# Patient Record
Sex: FEMALE | Race: WHITE | NOT HISPANIC OR LATINO | Employment: FULL TIME | URBAN - METROPOLITAN AREA
[De-identification: names, ages, dates, MRNs, and addresses within clinical notes are randomized per-mention and may not be internally consistent; named-entity substitution may affect disease eponyms.]

---

## 2023-07-10 ENCOUNTER — TELEPHONE (OUTPATIENT)
Dept: PRIMARY CARE | Facility: CLINIC | Age: 43
End: 2023-07-10
Payer: COMMERCIAL

## 2023-07-10 DIAGNOSIS — N64.59 ABNORMAL BREAST FINDING: Primary | ICD-10-CM

## 2023-07-10 NOTE — TELEPHONE ENCOUNTER
----- Message from Taylor Penny MD sent at 7/10/2023 11:12 AM EDT -----  Mammo showed some  asymmetry in the left breast.  They wan t to do  additional pictures.  Order placed.

## 2023-10-05 PROBLEM — K58.9 IRRITABLE BOWEL SYNDROME: Status: ACTIVE | Noted: 2023-10-05

## 2023-10-05 PROBLEM — M25.511 CHRONIC RIGHT SHOULDER PAIN: Status: ACTIVE | Noted: 2023-10-05

## 2023-10-05 PROBLEM — M62.89 HIGH-TONE PELVIC FLOOR DYSFUNCTION: Status: ACTIVE | Noted: 2023-10-05

## 2023-10-05 PROBLEM — H35.412 LATTICE DEGENERATION OF PERIPHERAL RETINA, LEFT: Status: ACTIVE | Noted: 2023-10-05

## 2023-10-05 PROBLEM — H52.13 BILATERAL MYOPIA: Status: ACTIVE | Noted: 2023-10-05

## 2023-10-05 PROBLEM — H52.203 ASTIGMATISM, BILATERAL: Status: ACTIVE | Noted: 2023-10-05

## 2023-10-05 PROBLEM — H52.12: Status: ACTIVE | Noted: 2023-10-05

## 2023-10-05 PROBLEM — H53.001 AMBLYOPIA OF RIGHT EYE: Status: ACTIVE | Noted: 2023-10-05

## 2023-10-05 PROBLEM — H52.7 REFRACTION ERROR: Status: ACTIVE | Noted: 2023-10-05

## 2023-10-05 PROBLEM — M79.18 MYOFASCIAL MUSCLE PAIN: Status: ACTIVE | Noted: 2023-10-05

## 2023-10-05 PROBLEM — D35.1 PARATHYROID ADENOMA: Status: ACTIVE | Noted: 2023-10-05

## 2023-10-05 PROBLEM — H52.31 ANISOMETROPIA: Status: ACTIVE | Noted: 2023-10-05

## 2023-10-05 PROBLEM — G43.109 MIGRAINE WITH AURA AND WITHOUT STATUS MIGRAINOSUS, NOT INTRACTABLE: Status: ACTIVE | Noted: 2023-10-05

## 2023-10-05 PROBLEM — E55.9 VITAMIN D DEFICIENCY: Status: ACTIVE | Noted: 2023-10-05

## 2023-10-05 PROBLEM — R10.2 FEMALE PELVIC PAIN: Status: ACTIVE | Noted: 2023-10-05

## 2023-10-05 PROBLEM — G43.009 MIGRAINE WITHOUT AURA AND WITHOUT STATUS MIGRAINOSUS, NOT INTRACTABLE: Status: ACTIVE | Noted: 2023-10-05

## 2023-10-05 PROBLEM — R09.A2 GLOBUS PHARYNGEUS: Status: ACTIVE | Noted: 2023-10-05

## 2023-10-05 PROBLEM — G56.21 ULNAR NEUROPATHY OF RIGHT UPPER EXTREMITY: Status: ACTIVE | Noted: 2023-10-05

## 2023-10-05 PROBLEM — H40.003 GLAUCOMA SUSPECT OF BOTH EYES: Status: ACTIVE | Noted: 2023-10-05

## 2023-10-05 PROBLEM — M19.011 ARTHRITIS OF RIGHT ACROMIOCLAVICULAR JOINT: Status: ACTIVE | Noted: 2023-10-05

## 2023-10-05 PROBLEM — E06.3 HASHIMOTO'S DISEASE: Status: ACTIVE | Noted: 2023-10-05

## 2023-10-05 PROBLEM — M79.7 FIBROMYALGIA: Status: ACTIVE | Noted: 2023-10-05

## 2023-10-05 PROBLEM — E04.1 SOLITARY THYROID NODULE: Status: ACTIVE | Noted: 2023-10-05

## 2023-10-05 PROBLEM — G47.00 INSOMNIA: Status: ACTIVE | Noted: 2023-10-05

## 2023-10-05 PROBLEM — K64.9 HEMORRHOID: Status: ACTIVE | Noted: 2023-10-05

## 2023-10-05 PROBLEM — M99.09 SEGMENTAL AND SOMATIC DYSFUNCTION: Status: ACTIVE | Noted: 2023-10-05

## 2023-10-05 PROBLEM — F41.8 DEPRESSION WITH ANXIETY: Status: ACTIVE | Noted: 2023-10-05

## 2023-10-05 PROBLEM — N94.6 DYSMENORRHEA: Status: ACTIVE | Noted: 2023-10-05

## 2023-10-05 PROBLEM — N94.10 UNSPECIFIED DYSPAREUNIA: Status: ACTIVE | Noted: 2023-10-05

## 2023-10-05 PROBLEM — G25.2 RESTING TREMOR: Status: ACTIVE | Noted: 2023-10-05

## 2023-10-05 PROBLEM — F41.9 ANXIETY: Status: ACTIVE | Noted: 2023-10-05

## 2023-10-05 PROBLEM — H52.4 BILATERAL PRESBYOPIA: Status: ACTIVE | Noted: 2023-10-05

## 2023-10-05 PROBLEM — E03.9 HYPOTHYROIDISM: Status: ACTIVE | Noted: 2023-10-05

## 2023-10-05 PROBLEM — M54.12 CERVICAL RADICULOPATHY: Status: ACTIVE | Noted: 2023-10-05

## 2023-10-05 PROBLEM — F32.2 CURRENT SEVERE EPISODE OF MAJOR DEPRESSIVE DISORDER WITHOUT PSYCHOTIC FEATURES (MULTI): Status: ACTIVE | Noted: 2023-10-05

## 2023-10-05 PROBLEM — G89.29 CHRONIC RIGHT SHOULDER PAIN: Status: ACTIVE | Noted: 2023-10-05

## 2023-10-05 PROBLEM — H35.411 LATTICE DEGENERATION OF RETINA, RIGHT: Status: ACTIVE | Noted: 2023-10-05

## 2023-10-05 RX ORDER — TIZANIDINE HYDROCHLORIDE 4 MG/1
4 CAPSULE, GELATIN COATED ORAL NIGHTLY PRN
COMMUNITY
Start: 2023-08-22 | End: 2023-10-06 | Stop reason: ALTCHOICE

## 2023-10-05 RX ORDER — DIPHENHYDRAMINE HCL 50 MG
1 CAPSULE ORAL NIGHTLY PRN
COMMUNITY
Start: 2020-06-17 | End: 2023-10-06 | Stop reason: ALTCHOICE

## 2023-10-05 RX ORDER — LEVOTHYROXINE SODIUM 100 UG/1
100 TABLET ORAL DAILY
COMMUNITY
End: 2024-02-11

## 2023-10-05 RX ORDER — CELECOXIB 200 MG/1
200 CAPSULE ORAL DAILY
COMMUNITY
End: 2023-10-06

## 2023-10-05 RX ORDER — TIMOLOL MALEATE 5 MG/ML
SOLUTION/ DROPS OPHTHALMIC
COMMUNITY
Start: 2023-05-16 | End: 2023-10-06 | Stop reason: ALTCHOICE

## 2023-10-05 RX ORDER — LATANOPROST/PF 0.005 %
1 DROPS OPHTHALMIC (EYE) EVERY MORNING
COMMUNITY
Start: 2023-07-10 | End: 2023-12-16 | Stop reason: WASHOUT

## 2023-10-05 RX ORDER — IBUPROFEN 600 MG/1
600 TABLET ORAL
COMMUNITY
Start: 2018-05-09 | End: 2023-10-06 | Stop reason: ALTCHOICE

## 2023-10-05 RX ORDER — TOBRAMYCIN AND DEXAMETHASONE 3; 1 MG/ML; MG/ML
SUSPENSION/ DROPS OPHTHALMIC
COMMUNITY
Start: 2023-05-23 | End: 2023-10-06 | Stop reason: ALTCHOICE

## 2023-10-05 RX ORDER — LITHIUM CARBONATE 150 MG/1
1 CAPSULE ORAL 2 TIMES DAILY
COMMUNITY
Start: 2023-04-26 | End: 2023-10-06 | Stop reason: SINTOL

## 2023-10-05 RX ORDER — TIZANIDINE 2 MG/1
TABLET ORAL
COMMUNITY
End: 2023-10-06 | Stop reason: ALTCHOICE

## 2023-10-05 RX ORDER — HYDROXYZINE HYDROCHLORIDE 25 MG/1
TABLET, FILM COATED ORAL
COMMUNITY
Start: 2023-08-18 | End: 2023-10-06 | Stop reason: SDUPTHER

## 2023-10-05 RX ORDER — MELATONIN 10 MG
10 CAPSULE ORAL
COMMUNITY

## 2023-10-06 ENCOUNTER — TELEMEDICINE (OUTPATIENT)
Dept: BEHAVIORAL HEALTH | Facility: CLINIC | Age: 43
End: 2023-10-06
Payer: COMMERCIAL

## 2023-10-06 DIAGNOSIS — F41.9 ANXIETY: ICD-10-CM

## 2023-10-06 DIAGNOSIS — F33.2 SEVERE EPISODE OF RECURRENT MAJOR DEPRESSIVE DISORDER, WITHOUT PSYCHOTIC FEATURES (MULTI): ICD-10-CM

## 2023-10-06 PROCEDURE — 99214 OFFICE O/P EST MOD 30 MIN: CPT | Performed by: PSYCHIATRY & NEUROLOGY

## 2023-10-06 RX ORDER — HYDROXYZINE HYDROCHLORIDE 25 MG/1
25 TABLET, FILM COATED ORAL 3 TIMES DAILY PRN
Qty: 90 TABLET | Refills: 0
Start: 2023-10-06 | End: 2024-04-23

## 2023-10-06 ASSESSMENT — ENCOUNTER SYMPTOMS
DYSPHORIC MOOD: 0
NERVOUS/ANXIOUS: 0

## 2023-10-06 NOTE — PROGRESS NOTES
"Adult Ambulatory Psychiatry Progress Note      Assessment/Plan     Impression:  Barbara Koroma is a 42 y.o. 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    A 41yo F domiciled w/ , employed at  in medical education w/ MD[employer] and [employer] presents for follow up.     Depression/anxiety - on ECT session 7 of 12, c/w hydroxyzine 25mg TID prn panic, f/u 4 weeks  Plan:   Medication:  Diagnoses and all orders for this visit:  Severe episode of recurrent major depressive disorder, without psychotic features (CMS/HCC) - completed ECT  Anxiety  -     hydrOXYzine HCL (Atarax) 25 mg tablet; Take 1 tablet (25 mg) by mouth 3 times a day as needed for allergies.      Therapy: none      Subjective   HPI:  Pt presented on time. She completed all her ECT sessions. She no longer has SI. Mood \"overall fine, better than it's been\". She's having trouble falling asleep. She has nightmares that someone is trying to hurt her and she can't get away. She fights to wake up, sits up for 15 minutes and then goes back to sleep. She feels tired from her sleep issues. She has some anxiety about going back to work. Discussed anesthesia as possible contributor to the nightmares.           Review of Systems   Psychiatric/Behavioral:  Negative for dysphoric mood and suicidal ideas. The patient is not nervous/anxious.          Objective   Mental Status Exam:  General Appearance: Well groomed, appropriate eye contact  Attitude/Behavior: Cooperative  Motor: No psychomotor agitation or retardation, no tremor or other abnormal movements  Speech: Normal rate, volume, prosody  Mood: \"ok\"  Affect: Euthymic, full-range  Thought Process: Linear, goal directed  Thought Associations: No loosening of associations  Thought Content: Normal  Perception: No perceptual abnormalities noted  Insight: Intact  Judgement: Intact    Vitals:  There were no vitals filed for this " visit.    Current Medications:  Current Outpatient Medications on File Prior to Visit   Medication Sig Dispense Refill    hydrOXYzine HCL (Atarax) 25 mg tablet TAKE 0.5-1 TABLET 3 TIMES DAILY AS NEEDED for anxiety      latanoprost, PF, 0.005 % drops Administer 1 drop into affected eye(s) once daily in the morning.      levothyroxine (Synthroid, Levoxyl) 100 mcg tablet Take 1 tablet (100 mcg) by mouth once daily.      melatonin 10 mg capsule Take 1 capsule (10 mg) by mouth. As needed for sleep      [DISCONTINUED] celecoxib (CeleBREX) 200 mg capsule Take 1 capsule (200 mg) by mouth once daily.      [DISCONTINUED] diphenhydrAMINE (Unisom SleepGels) 50 mg capsule Take 1 capsule (50 mg) by mouth as needed at bedtime.      [DISCONTINUED] ibuprofen 600 mg tablet Take 1 tablet (600 mg) by mouth. Every 6-8 hours as needed with food      [DISCONTINUED] lithium 150 mg capsule Take 1 capsule (150 mg) by mouth 2 times a day.      [DISCONTINUED] timolol (Timoptic) 0.5 % ophthalmic solution Instill one gtt o.u. qam      [DISCONTINUED] tiZANidine (Zanaflex) 2 mg tablet TAKE 1 TO 2 TABLETS BY MOUTH AT BEDTIME AS NEEDED      [DISCONTINUED] tiZANidine (Zanaflex) 4 mg capsule Take 1 capsule (4 mg) by mouth as needed at bedtime.      [DISCONTINUED] tobramycin-dexamethasone (Tobradex) ophthalmic suspension Administer into affected eye(s).       No current facility-administered medications on file prior to visit.       Lab Review:   No visits with results within 2 Month(s) from this visit.   Latest known visit with results is:   Legacy Encounter on 02/02/2023   Component Date Value    Glucose 02/02/2023 77     Sodium 02/02/2023 137     Potassium 02/02/2023 4.5     Chloride 02/02/2023 101     Bicarbonate 02/02/2023 26     Anion Gap 02/02/2023 15     Urea Nitrogen 02/02/2023 9     Creatinine 02/02/2023 0.91     GFR Female 02/02/2023 80     Calcium 02/02/2023 9.6     Albumin 02/02/2023 4.4     Alkaline Phosphatase 02/02/2023 73     Total  Protein 02/02/2023 7.9     AST 02/02/2023 27     Total Bilirubin 02/02/2023 0.5     ALT (SGPT) 02/02/2023 30     TSH 02/02/2023 2.78     WBC 02/02/2023 10.4     nRBC 02/02/2023 0.0     RBC 02/02/2023 4.52     Hemoglobin 02/02/2023 14.3     Hematocrit 02/02/2023 42.6     MCV 02/02/2023 94     MCHC 02/02/2023 33.6     Platelets 02/02/2023 283     RDW 02/02/2023 12.5     Neutrophils % 02/02/2023 69.3     Immature Granulocytes %,* 02/02/2023 0.4     Lymphocytes % 02/02/2023 20.9     Monocytes % 02/02/2023 6.9     Eosinophils % 02/02/2023 2.1     Basophils % 02/02/2023 0.4     Neutrophils Absolute 02/02/2023 7.23     Lymphocytes Absolute 02/02/2023 2.18     Monocytes Absolute 02/02/2023 0.72     Eosinophils Absolute 02/02/2023 0.22     Basophils Absolute 02/02/2023 0.04          Time Spent:         Next Appointment:  No follow-ups on file.

## 2023-11-17 ENCOUNTER — TELEMEDICINE (OUTPATIENT)
Dept: BEHAVIORAL HEALTH | Facility: CLINIC | Age: 43
End: 2023-11-17
Payer: COMMERCIAL

## 2023-11-17 DIAGNOSIS — F33.2 SEVERE EPISODE OF RECURRENT MAJOR DEPRESSIVE DISORDER, WITHOUT PSYCHOTIC FEATURES (MULTI): Primary | ICD-10-CM

## 2023-11-17 DIAGNOSIS — F41.9 ANXIETY: ICD-10-CM

## 2023-11-17 PROCEDURE — 99214 OFFICE O/P EST MOD 30 MIN: CPT | Performed by: PSYCHIATRY & NEUROLOGY

## 2023-11-17 ASSESSMENT — ENCOUNTER SYMPTOMS
DYSPHORIC MOOD: 1
NERVOUS/ANXIOUS: 1

## 2023-11-17 NOTE — PROGRESS NOTES
"Adult Ambulatory Psychiatry Progress Note      Assessment/Plan     Impression:  Barbara Koroma is a 42 y.o. 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    Plan:   Depression/anxiety - discussed wellbutrin or auvelity, c/w hydroxyzine 25mg TID prn panic, f/u 6 weeks       Subjective   HPI:  Pt arrived on time. Mood \"not well\". Her depression is returning. She's having trouble with insurance and has a bill of $44163. She's reluctant to go back given the short benefits. Discussed auvelity as an option. Pt is seeing therapist. She thinks part of the depression is due to external factors. The passive SI is back. Denies plan or intent. Suggested starting vitamin D. She's exercising more.           Review of Systems   Psychiatric/Behavioral:  Positive for dysphoric mood and suicidal ideas. The patient is nervous/anxious.        Objective   Mental Status Exam:  General Appearance: Well groomed, appropriate eye contact  Attitude/Behavior: Cooperative  Motor: No psychomotor agitation or retardation, no tremor or other abnormal movements  Speech: Normal rate, volume, prosody  Mood: depressed  Affect: Dysphoric, constricted but reactive  Thought Process: Linear, goal directed  Thought Associations: No loosening of associations  Thought Content:  (occasional passive SI)  Perception: No perceptual abnormalities noted  Insight: Intact  Judgement: Intact    Vitals:  There were no vitals filed for this visit.    Current Medications:  Current Outpatient Medications on File Prior to Visit   Medication Sig Dispense Refill    hydrOXYzine HCL (Atarax) 25 mg tablet Take 1 tablet (25 mg) by mouth 3 times a day as needed for allergies. 90 tablet 0    latanoprost, PF, 0.005 % drops Administer 1 drop into affected eye(s) once daily in the morning.      levothyroxine (Synthroid, Levoxyl) 100 mcg tablet Take 1 tablet (100 mcg) by mouth once daily.      melatonin 10 mg " capsule Take 1 capsule (10 mg) by mouth. As needed for sleep       No current facility-administered medications on file prior to visit.       Time Spent:    Prep time: 2  Direct patient time: 25  Documentation time: 5  Total time: 32 min

## 2023-11-26 DIAGNOSIS — H52.13 MYOPIA, BILATERAL: ICD-10-CM

## 2023-11-26 RX ORDER — CELECOXIB 200 MG/1
200 CAPSULE ORAL DAILY
Qty: 90 CAPSULE | Refills: 0 | Status: SHIPPED | OUTPATIENT
Start: 2023-11-26 | End: 2023-12-16 | Stop reason: WASHOUT

## 2023-12-16 ENCOUNTER — OFFICE VISIT (OUTPATIENT)
Dept: OPHTHALMOLOGY | Facility: CLINIC | Age: 43
End: 2023-12-16
Payer: COMMERCIAL

## 2023-12-16 DIAGNOSIS — H40.1131 PRIMARY OPEN ANGLE GLAUCOMA (POAG) OF BOTH EYES, MILD STAGE: Primary | ICD-10-CM

## 2023-12-16 PROCEDURE — 99212 OFFICE O/P EST SF 10 MIN: CPT | Performed by: OPHTHALMOLOGY

## 2023-12-16 ASSESSMENT — TONOMETRY
IOP_METHOD: GOLDMANN APPLANATION
OS_IOP_MMHG: 22
OD_IOP_MMHG: 20

## 2023-12-16 ASSESSMENT — ENCOUNTER SYMPTOMS
GASTROINTESTINAL NEGATIVE: 0
ALLERGIC/IMMUNOLOGIC NEGATIVE: 0
EYES NEGATIVE: 0
HEMATOLOGIC/LYMPHATIC NEGATIVE: 0
MUSCULOSKELETAL NEGATIVE: 0
RESPIRATORY NEGATIVE: 0
ENDOCRINE NEGATIVE: 0
NEUROLOGICAL NEGATIVE: 0
CARDIOVASCULAR NEGATIVE: 0
CONSTITUTIONAL NEGATIVE: 0
PSYCHIATRIC NEGATIVE: 0

## 2023-12-16 ASSESSMENT — CONF VISUAL FIELD
OD_INFERIOR_NASAL_RESTRICTION: 0
OD_NORMAL: 1
OD_SUPERIOR_NASAL_RESTRICTION: 0
OS_INFERIOR_NASAL_RESTRICTION: 0
OS_SUPERIOR_NASAL_RESTRICTION: 0
OS_SUPERIOR_TEMPORAL_RESTRICTION: 0
OD_SUPERIOR_TEMPORAL_RESTRICTION: 0
OD_INFERIOR_TEMPORAL_RESTRICTION: 0
OS_NORMAL: 1
OS_INFERIOR_TEMPORAL_RESTRICTION: 0

## 2023-12-16 ASSESSMENT — SLIT LAMP EXAM - LIDS
COMMENTS: GOOD POSITION
COMMENTS: GOOD POSITION

## 2023-12-16 ASSESSMENT — CUP TO DISC RATIO
OD_RATIO: .3
OS_RATIO: .3

## 2023-12-16 ASSESSMENT — VISUAL ACUITY
CORRECTION_TYPE: GLASSES
OS_CC: 20/20
METHOD: SNELLEN - LINEAR
OD_CC: 20/50

## 2023-12-16 ASSESSMENT — EXTERNAL EXAM - RIGHT EYE: OD_EXAM: NORMAL

## 2023-12-16 ASSESSMENT — PACHYMETRY
OD_CT(UM): 564
OS_CT(UM): 562

## 2023-12-16 ASSESSMENT — EXTERNAL EXAM - LEFT EYE: OS_EXAM: NORMAL

## 2023-12-18 ENCOUNTER — HOSPITAL ENCOUNTER (OUTPATIENT)
Dept: RADIOLOGY | Facility: HOSPITAL | Age: 43
Discharge: HOME | End: 2023-12-18
Payer: COMMERCIAL

## 2023-12-18 VITALS — WEIGHT: 185 LBS | HEIGHT: 62 IN | BODY MASS INDEX: 34.04 KG/M2

## 2023-12-18 DIAGNOSIS — N64.59 ABNORMAL BREAST FINDING: ICD-10-CM

## 2023-12-18 DIAGNOSIS — N64.59 OTHER SIGNS AND SYMPTOMS IN BREAST: ICD-10-CM

## 2023-12-18 PROCEDURE — 77061 BREAST TOMOSYNTHESIS UNI: CPT | Mod: LT

## 2023-12-18 PROCEDURE — 76642 ULTRASOUND BREAST LIMITED: CPT | Mod: LT

## 2024-01-19 ENCOUNTER — TELEMEDICINE (OUTPATIENT)
Dept: BEHAVIORAL HEALTH | Facility: CLINIC | Age: 44
End: 2024-01-19
Payer: COMMERCIAL

## 2024-01-19 DIAGNOSIS — F41.9 ANXIETY: ICD-10-CM

## 2024-01-19 DIAGNOSIS — F33.2 SEVERE EPISODE OF RECURRENT MAJOR DEPRESSIVE DISORDER, WITHOUT PSYCHOTIC FEATURES (MULTI): Primary | ICD-10-CM

## 2024-01-19 PROCEDURE — 99213 OFFICE O/P EST LOW 20 MIN: CPT | Performed by: PSYCHIATRY & NEUROLOGY

## 2024-01-19 ASSESSMENT — ENCOUNTER SYMPTOMS
NERVOUS/ANXIOUS: 1
DYSPHORIC MOOD: 1

## 2024-01-19 NOTE — PROGRESS NOTES
"Adult Ambulatory Psychiatry Progress Note      Assessment/Plan     Impression:  Barbara Koroma is a 43 y.o. 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 Will refer to discuss spravato.    Plan:   Depression/anxiety - discussed spravato, c/w hydroxyzine 25mg TID prn panic, f/u 3 months      Subjective   HPI:  Pt arrived on time. Mood \"depressed\" since last session. She thought about the auvelity but doesn't know why it would work when the others wouldn't. Reviewed the rationale behind an NMDA antagonist. Discussed spravato. She's possibly interested but wants to make sure it's logistically feasible. She's had some bad luck. She had food poisoning and when it resolved her period started and she gets worsening SI during that time. Denies plan or intent.           Review of Systems   Psychiatric/Behavioral:  Positive for dysphoric mood and suicidal ideas. The patient is nervous/anxious.        Objective   Mental Status Exam:  General Appearance: Well groomed, appropriate eye contact  Attitude/Behavior: Cooperative  Motor: No psychomotor agitation or retardation, no tremor or other abnormal movements  Speech: Normal rate, volume, prosody  Mood: depressed  Affect: Congruent with mood and topic of conversation  Thought Process: Linear, goal directed  Thought Associations: No loosening of associations  Thought Content: Normal  Perception: No perceptual abnormalities noted  Insight: Intact  Judgement: Intact    Vitals:  There were no vitals filed for this visit.    Current Medications:  Current Outpatient Medications on File Prior to Visit   Medication Sig Dispense Refill    hydrOXYzine HCL (Atarax) 25 mg tablet Take 1 tablet (25 mg) by mouth 3 times a day as needed for allergies. 90 tablet 0    levothyroxine (Synthroid, Levoxyl) 100 mcg tablet Take 1 tablet (100 mcg) by mouth once daily.      melatonin 10 mg capsule Take 1 capsule (10 mg) " by mouth. As needed for sleep       No current facility-administered medications on file prior to visit.       Lab Review:   No visits with results within 2 Month(s) from this visit.   Latest known visit with results is:   Legacy Encounter on 02/02/2023   Component Date Value    Glucose 02/02/2023 77     Sodium 02/02/2023 137     Potassium 02/02/2023 4.5     Chloride 02/02/2023 101     Bicarbonate 02/02/2023 26     Anion Gap 02/02/2023 15     Urea Nitrogen 02/02/2023 9     Creatinine 02/02/2023 0.91     GFR Female 02/02/2023 80     Calcium 02/02/2023 9.6     Albumin 02/02/2023 4.4     Alkaline Phosphatase 02/02/2023 73     Total Protein 02/02/2023 7.9     AST 02/02/2023 27     Total Bilirubin 02/02/2023 0.5     ALT (SGPT) 02/02/2023 30     TSH 02/02/2023 2.78     WBC 02/02/2023 10.4     nRBC 02/02/2023 0.0     RBC 02/02/2023 4.52     Hemoglobin 02/02/2023 14.3     Hematocrit 02/02/2023 42.6     MCV 02/02/2023 94     MCHC 02/02/2023 33.6     Platelets 02/02/2023 283     RDW 02/02/2023 12.5     Neutrophils % 02/02/2023 69.3     Immature Granulocytes %,* 02/02/2023 0.4     Lymphocytes % 02/02/2023 20.9     Monocytes % 02/02/2023 6.9     Eosinophils % 02/02/2023 2.1     Basophils % 02/02/2023 0.4     Neutrophils Absolute 02/02/2023 7.23     Lymphocytes Absolute 02/02/2023 2.18     Monocytes Absolute 02/02/2023 0.72     Eosinophils Absolute 02/02/2023 0.22     Basophils Absolute 02/02/2023 0.04        Orders:  Diagnoses and all orders for this visit:  Severe episode of recurrent major depressive disorder, without psychotic features (CMS/HCC)  Anxiety      Time Spent:    Prep time: 2  Direct patient time: 13  Documentation time: 5  Total time: 20 min    Next Appointment:  Follow up in 3 months (on 4/19/2024).

## 2024-02-10 ENCOUNTER — OFFICE VISIT (OUTPATIENT)
Dept: OPHTHALMOLOGY | Facility: CLINIC | Age: 44
End: 2024-02-10
Payer: COMMERCIAL

## 2024-02-10 DIAGNOSIS — H40.1131 PRIMARY OPEN ANGLE GLAUCOMA (POAG) OF BOTH EYES, MILD STAGE: Primary | ICD-10-CM

## 2024-02-10 PROCEDURE — 99212 OFFICE O/P EST SF 10 MIN: CPT | Performed by: OPHTHALMOLOGY

## 2024-02-10 ASSESSMENT — TONOMETRY
OS_IOP_MMHG: 20
IOP_METHOD: TONOPEN
OD_IOP_MMHG: 18

## 2024-02-10 ASSESSMENT — SLIT LAMP EXAM - LIDS
COMMENTS: GOOD POSITION
COMMENTS: GOOD POSITION

## 2024-02-10 ASSESSMENT — CUP TO DISC RATIO
OS_RATIO: .3
OD_RATIO: .3

## 2024-02-10 ASSESSMENT — VISUAL ACUITY
OD_PH_CC: 20/40-2
METHOD: SNELLEN - LINEAR
OS_CC: 20/20-1
OD_CC: 20/60-1

## 2024-02-10 ASSESSMENT — PACHYMETRY
OS_CT(UM): 562
OD_CT(UM): 564

## 2024-02-10 ASSESSMENT — EXTERNAL EXAM - RIGHT EYE: OD_EXAM: NORMAL

## 2024-02-10 ASSESSMENT — EXTERNAL EXAM - LEFT EYE: OS_EXAM: NORMAL

## 2024-02-11 DIAGNOSIS — E03.9 HYPOTHYROIDISM, UNSPECIFIED: ICD-10-CM

## 2024-02-11 RX ORDER — LEVOTHYROXINE SODIUM 100 UG/1
100 TABLET ORAL DAILY
Qty: 90 TABLET | Refills: 0 | Status: SHIPPED | OUTPATIENT
Start: 2024-02-11 | End: 2024-04-25 | Stop reason: SDUPTHER

## 2024-02-17 ENCOUNTER — APPOINTMENT (OUTPATIENT)
Dept: OPHTHALMOLOGY | Facility: CLINIC | Age: 44
End: 2024-02-17
Payer: COMMERCIAL

## 2024-04-19 ENCOUNTER — APPOINTMENT (OUTPATIENT)
Dept: BEHAVIORAL HEALTH | Facility: CLINIC | Age: 44
End: 2024-04-19
Payer: COMMERCIAL

## 2024-04-19 PROBLEM — G56.20 ULNAR NEUROPATHY: Status: ACTIVE | Noted: 2023-10-05

## 2024-04-19 PROBLEM — L70.0 CYSTIC ACNE: Status: ACTIVE | Noted: 2024-04-19

## 2024-04-19 PROBLEM — Z86.59 HISTORY OF DEPRESSION: Status: ACTIVE | Noted: 2024-04-19

## 2024-04-19 PROBLEM — H52.7 DISORDER OF REFRACTION: Status: ACTIVE | Noted: 2021-09-04

## 2024-04-19 PROBLEM — M99.04 SOMATIC DYSFUNCTION OF SACRAL REGION: Status: ACTIVE | Noted: 2019-01-15

## 2024-04-19 PROBLEM — M54.2 NECK PAIN: Status: ACTIVE | Noted: 2019-01-15

## 2024-04-19 PROBLEM — M19.019 ACROMIOCLAVICULAR JOINT ARTHRITIS: Status: ACTIVE | Noted: 2023-10-05

## 2024-04-19 PROBLEM — E66.9 OBESITY (BMI 30-39.9): Status: ACTIVE | Noted: 2019-02-22

## 2024-04-19 PROBLEM — N92.1 MENOMETRORRHAGIA: Status: ACTIVE | Noted: 2024-04-19

## 2024-04-19 PROBLEM — N64.59 BREAST SIGNS AND SYMPTOMS: Status: ACTIVE | Noted: 2024-04-19

## 2024-04-19 PROBLEM — G89.29 CHRONIC PAIN OF RIGHT UPPER EXTREMITY: Status: ACTIVE | Noted: 2024-04-19

## 2024-04-19 PROBLEM — R51.9 HEADACHE: Status: ACTIVE | Noted: 2024-04-19

## 2024-04-19 PROBLEM — N93.9 ABNORMAL UTERINE BLEEDING: Status: ACTIVE | Noted: 2023-10-05

## 2024-04-19 PROBLEM — K58.0 IRRITABLE BOWEL SYNDROME WITH DIARRHEA: Status: ACTIVE | Noted: 2019-02-22

## 2024-04-19 PROBLEM — G47.20 SLEEP PATTERN DISTURBANCE: Status: ACTIVE | Noted: 2024-04-19

## 2024-04-19 PROBLEM — J02.9 SORE THROAT: Status: ACTIVE | Noted: 2023-10-05

## 2024-04-19 PROBLEM — E03.8 HYPOTHYROIDISM DUE TO HASHIMOTO'S THYROIDITIS: Status: ACTIVE | Noted: 2019-02-22

## 2024-04-19 PROBLEM — M99.07 SOMATIC DYSFUNCTION OF UPPER EXTREMITIES: Status: ACTIVE | Noted: 2019-01-15

## 2024-04-19 PROBLEM — F33.2 SEVERE EPISODE OF RECURRENT MAJOR DEPRESSIVE DISORDER, WITHOUT PSYCHOTIC FEATURES (MULTI): Status: ACTIVE | Noted: 2019-02-22

## 2024-04-19 PROBLEM — M25.511 RIGHT SHOULDER PAIN: Status: ACTIVE | Noted: 2019-01-15

## 2024-04-19 PROBLEM — E06.3 HYPOTHYROIDISM DUE TO HASHIMOTO'S THYROIDITIS: Status: ACTIVE | Noted: 2019-02-22

## 2024-04-19 PROBLEM — M79.601 CHRONIC PAIN OF RIGHT UPPER EXTREMITY: Status: ACTIVE | Noted: 2024-04-19

## 2024-04-19 PROBLEM — M99.02 THORACIC SEGMENT DYSFUNCTION: Status: ACTIVE | Noted: 2019-01-15

## 2024-04-19 PROBLEM — B49 INFECTION DUE TO FUNGUS: Status: ACTIVE | Noted: 2024-04-19

## 2024-04-19 PROBLEM — H35.419 RETINAL LATTICE DEGENERATION: Status: ACTIVE | Noted: 2023-10-05

## 2024-04-19 PROBLEM — M99.01 CERVICAL SEGMENT DYSFUNCTION: Status: ACTIVE | Noted: 2019-01-15

## 2024-04-19 PROBLEM — M79.601 PAIN OF RIGHT UPPER EXTREMITY: Status: ACTIVE | Noted: 2019-01-15

## 2024-04-19 RX ORDER — SUMATRIPTAN SUCCINATE 25 MG/1
TABLET ORAL
COMMUNITY
Start: 2017-04-25 | End: 2024-04-23 | Stop reason: ALTCHOICE

## 2024-04-19 RX ORDER — BUSPIRONE HYDROCHLORIDE 5 MG/1
TABLET ORAL
COMMUNITY
Start: 2020-06-17 | End: 2024-04-23 | Stop reason: ALTCHOICE

## 2024-04-19 RX ORDER — LATANOPROST 50 UG/ML
SOLUTION/ DROPS OPHTHALMIC
COMMUNITY
Start: 2024-01-14

## 2024-04-23 ENCOUNTER — OFFICE VISIT (OUTPATIENT)
Dept: PRIMARY CARE | Facility: CLINIC | Age: 44
End: 2024-04-23
Payer: COMMERCIAL

## 2024-04-23 ENCOUNTER — LAB (OUTPATIENT)
Dept: LAB | Facility: LAB | Age: 44
End: 2024-04-23
Payer: COMMERCIAL

## 2024-04-23 VITALS
TEMPERATURE: 97.3 F | SYSTOLIC BLOOD PRESSURE: 146 MMHG | HEIGHT: 62 IN | OXYGEN SATURATION: 100 % | BODY MASS INDEX: 35.33 KG/M2 | WEIGHT: 192 LBS | HEART RATE: 93 BPM | DIASTOLIC BLOOD PRESSURE: 88 MMHG

## 2024-04-23 DIAGNOSIS — E03.9 ACQUIRED HYPOTHYROIDISM: ICD-10-CM

## 2024-04-23 DIAGNOSIS — R10.84 GENERALIZED ABDOMINAL PAIN: ICD-10-CM

## 2024-04-23 DIAGNOSIS — R10.84 GENERALIZED ABDOMINAL PAIN: Primary | ICD-10-CM

## 2024-04-23 DIAGNOSIS — K52.9 CHRONIC DIARRHEA: ICD-10-CM

## 2024-04-23 DIAGNOSIS — K58.0 IRRITABLE BOWEL SYNDROME WITH DIARRHEA: ICD-10-CM

## 2024-04-23 LAB
ALBUMIN SERPL-MCNC: 4.5 G/DL (ref 3.5–5)
ALP BLD-CCNC: 73 U/L (ref 35–125)
ALT SERPL-CCNC: 28 U/L (ref 5–40)
ANION GAP SERPL CALC-SCNC: 13 MMOL/L
AST SERPL-CCNC: 22 U/L (ref 5–40)
BILIRUB SERPL-MCNC: <0.2 MG/DL (ref 0.1–1.2)
BUN SERPL-MCNC: 11 MG/DL (ref 8–25)
CALCIUM SERPL-MCNC: 9.8 MG/DL (ref 8.5–10.4)
CHLORIDE SERPL-SCNC: 102 MMOL/L (ref 97–107)
CO2 SERPL-SCNC: 25 MMOL/L (ref 24–31)
CREAT SERPL-MCNC: 0.8 MG/DL (ref 0.4–1.6)
EGFRCR SERPLBLD CKD-EPI 2021: >90 ML/MIN/1.73M*2
ERYTHROCYTE [DISTWIDTH] IN BLOOD BY AUTOMATED COUNT: 11.9 % (ref 11.5–14.5)
GLUCOSE SERPL-MCNC: 103 MG/DL (ref 65–99)
HCT VFR BLD AUTO: 39.6 % (ref 36–46)
HGB BLD-MCNC: 13.3 G/DL (ref 12–16)
MCH RBC QN AUTO: 31.4 PG (ref 26–34)
MCHC RBC AUTO-ENTMCNC: 33.6 G/DL (ref 32–36)
MCV RBC AUTO: 94 FL (ref 80–100)
NRBC BLD-RTO: 0 /100 WBCS (ref 0–0)
PLATELET # BLD AUTO: 281 X10*3/UL (ref 150–450)
POTASSIUM SERPL-SCNC: 4.5 MMOL/L (ref 3.4–5.1)
PROT SERPL-MCNC: 7.3 G/DL (ref 5.9–7.9)
RBC # BLD AUTO: 4.23 X10*6/UL (ref 4–5.2)
SODIUM SERPL-SCNC: 140 MMOL/L (ref 133–145)
TSH SERPL DL<=0.05 MIU/L-ACNC: 2.27 MIU/L (ref 0.27–4.2)
WBC # BLD AUTO: 7.2 X10*3/UL (ref 4.4–11.3)

## 2024-04-23 PROCEDURE — 84443 ASSAY THYROID STIM HORMONE: CPT

## 2024-04-23 PROCEDURE — 36415 COLL VENOUS BLD VENIPUNCTURE: CPT

## 2024-04-23 PROCEDURE — 80053 COMPREHEN METABOLIC PANEL: CPT

## 2024-04-23 PROCEDURE — 1036F TOBACCO NON-USER: CPT | Performed by: INTERNAL MEDICINE

## 2024-04-23 PROCEDURE — 99214 OFFICE O/P EST MOD 30 MIN: CPT | Performed by: INTERNAL MEDICINE

## 2024-04-23 PROCEDURE — 85027 COMPLETE CBC AUTOMATED: CPT

## 2024-04-23 RX ORDER — DICYCLOMINE HYDROCHLORIDE 10 MG/1
10 CAPSULE ORAL 3 TIMES DAILY PRN
Qty: 30 CAPSULE | Refills: 0 | Status: SHIPPED | OUTPATIENT
Start: 2024-04-23

## 2024-04-23 ASSESSMENT — ENCOUNTER SYMPTOMS
VOMITING: 0
ARTHRALGIAS: 0
FEVER: 0
BELCHING: 0
SWEATS: 0
BLOATING: 0
FLATUS: 0
NAUSEA: 0
CHILLS: 0
WEIGHT LOSS: 0
HEMATOCHEZIA: 0
MYALGIAS: 0
HEADACHES: 0
DYSURIA: 0
ABDOMINAL PAIN: 1
HEMATURIA: 0
DIARRHEA: 1
ANOREXIA: 0
FREQUENCY: 0
CONSTIPATION: 0

## 2024-04-23 ASSESSMENT — CROHNS DISEASE ACTIVITY INDEX (CDAI): CDAI SCORE: 0

## 2024-04-23 ASSESSMENT — COLUMBIA-SUICIDE SEVERITY RATING SCALE - C-SSRS
2. HAVE YOU ACTUALLY HAD ANY THOUGHTS OF KILLING YOURSELF?: NO
1. IN THE PAST MONTH, HAVE YOU WISHED YOU WERE DEAD OR WISHED YOU COULD GO TO SLEEP AND NOT WAKE UP?: NO
6. HAVE YOU EVER DONE ANYTHING, STARTED TO DO ANYTHING, OR PREPARED TO DO ANYTHING TO END YOUR LIFE?: NO

## 2024-04-23 ASSESSMENT — PATIENT HEALTH QUESTIONNAIRE - PHQ9
4. FEELING TIRED OR HAVING LITTLE ENERGY: SEVERAL DAYS
SUM OF ALL RESPONSES TO PHQ9 QUESTIONS 1 AND 2: 6
10. IF YOU CHECKED OFF ANY PROBLEMS, HOW DIFFICULT HAVE THESE PROBLEMS MADE IT FOR YOU TO DO YOUR WORK, TAKE CARE OF THINGS AT HOME, OR GET ALONG WITH OTHER PEOPLE: VERY DIFFICULT
9. THOUGHTS THAT YOU WOULD BE BETTER OFF DEAD, OR OF HURTING YOURSELF: NOT AT ALL
8. MOVING OR SPEAKING SO SLOWLY THAT OTHER PEOPLE COULD HAVE NOTICED. OR THE OPPOSITE, BEING SO FIGETY OR RESTLESS THAT YOU HAVE BEEN MOVING AROUND A LOT MORE THAN USUAL: NOT AT ALL
3. TROUBLE FALLING OR STAYING ASLEEP OR SLEEPING TOO MUCH: NOT AT ALL
2. FEELING DOWN, DEPRESSED OR HOPELESS: NEARLY EVERY DAY
1. LITTLE INTEREST OR PLEASURE IN DOING THINGS: NEARLY EVERY DAY
7. TROUBLE CONCENTRATING ON THINGS, SUCH AS READING THE NEWSPAPER OR WATCHING TELEVISION: NEARLY EVERY DAY
SUM OF ALL RESPONSES TO PHQ QUESTIONS 1-9: 15
5. POOR APPETITE OR OVEREATING: MORE THAN HALF THE DAYS
6. FEELING BAD ABOUT YOURSELF - OR THAT YOU ARE A FAILURE OR HAVE LET YOURSELF OR YOUR FAMILY DOWN: NEARLY EVERY DAY

## 2024-04-23 ASSESSMENT — PAIN SCALES - GENERAL: PAINLEVEL: 5

## 2024-04-23 NOTE — PROGRESS NOTES
[facility]: [facility]  PROGRESS NOTE      Barbara Koroma is a 43 y.o. female that is presenting today for No chief complaint on file..    Assessment/Plan   Diagnoses and all orders for this visit:  Generalized abdominal pain     Mild with normal exam  -     Comprehensive metabolic panel; Future  -     CBC; Future  -     Referral to Gastroenterology; Future  Chronic diarrhea      Could be IBS related but will check cultures         -     Comprehensive metabolic panel; Future  -     CBC; Future  -     Stool Pathogen Panel, PCR  -     C. difficile, PCR  -     Lactoferrin, Fecal, Quantitative; Future  -     Referral to Gastroenterology; Future  Irritable bowel syndrome with diarrhea      Will start on Bentyl   -     dicyclomine (Bentyl) 10 mg capsule; Take 1 capsule (10 mg) by mouth 3 times a day as needed (abdominal pain or cramps).  -     Referral to Gastroenterology; Future  Acquired hypothyroidism  -     TSH with reflex to Free T4 if abnormal; Future  Subjective      I'm seeing this patient of Zohaib today for sick visit. Patient's available medical records reviewed.     - Patient C/O flare of her IBS for the past 2 -3 months     - Patient denies any X symptoms or concerns at this time.    - patient denies any adverse reactions to or concerns with his/her meds.      Abdominal Pain  The current episode started more than 1 month ago. The onset quality is gradual. The problem occurs constantly. The problem has been gradually worsening. The pain is located in the generalized abdominal region. The quality of the pain is burning. The abdominal pain radiates to the LLQ. Associated symptoms include diarrhea. Pertinent negatives include no anorexia, arthralgias, belching, constipation, dysuria, fever, flatus, frequency, headaches, hematochezia, hematuria, melena, myalgias, nausea, vomiting or weight loss. Nothing aggravates the pain. Relieved by: Soaking in a bath with Epson salt. The treatment  provided mild relief. Prior diagnostic workup includes lower endoscopy, GI consult, upper endoscopy and CT scan (W/U done in 2012). Her past medical history is significant for irritable bowel syndrome. There is no history of abdominal surgery, colon cancer, Crohn's disease, gallstones, GERD, pancreatitis, PUD or ulcerative colitis.   Diarrhea   This is a chronic problem. The current episode started more than 1 month ago. The problem occurs 5 to 10 times per day. The problem has been gradually worsening. The stool consistency is described as Watery and mucous. The patient states that diarrhea does not awaken her from sleep. Associated symptoms include abdominal pain. Pertinent negatives include no arthralgias, bloating, chills, fever, headaches, increased  flatus, myalgias, sweats, vomiting or weight loss. Nothing aggravates the symptoms. There are no known risk factors. She has tried nothing for the symptoms. Her past medical history is significant for irritable bowel syndrome. There is no history of bowel resection, inflammatory bowel disease, malabsorption, a recent abdominal surgery or short gut syndrome.   Review of Systems   Constitutional:  Negative for chills, fever and weight loss.   Gastrointestinal:  Positive for abdominal pain and diarrhea. Negative for anorexia, bloating, constipation, flatus, hematochezia, melena, nausea and vomiting.   Genitourinary:  Negative for dysuria, frequency and hematuria.   Musculoskeletal:  Negative for arthralgias and myalgias.   Neurological:  Negative for headaches.      All pertinent POSITIVES as noted per HPI.  All other systems have been reviewed and are NEGATIVE and /or Noncontributory to this patient current visit or complaint.    Objective   There were no vitals filed for this visit.   There is no height or weight on file to calculate BMI.  Physical Exam  Vitals and nursing note reviewed.   Constitutional:       Appearance: Normal appearance.   HENT:      Head:  "Normocephalic and atraumatic.   Neck:      Vascular: No carotid bruit.   Cardiovascular:      Rate and Rhythm: Normal rate and regular rhythm.      Pulses: Normal pulses.      Heart sounds: Normal heart sounds.   Pulmonary:      Effort: Pulmonary effort is normal.      Breath sounds: Normal breath sounds.   Abdominal:      General: Abdomen is flat. Bowel sounds are normal.      Palpations: Abdomen is soft.   Musculoskeletal:         General: No swelling. Normal range of motion.      Cervical back: Neck supple.   Lymphadenopathy:      Cervical: No cervical adenopathy.   Skin:     General: Skin is warm and dry.   Neurological:      Mental Status: She is alert.   Psychiatric:         Mood and Affect: Mood normal.     Diagnostic Results   Lab Results   Component Value Date    GLUCOSE 77 02/02/2023    CALCIUM 9.6 02/02/2023     02/02/2023    K 4.5 02/02/2023    CO2 26 02/02/2023     02/02/2023    BUN 9 02/02/2023    CREATININE 0.91 02/02/2023     Lab Results   Component Value Date    ALT 30 02/02/2023    AST 27 02/02/2023    ALKPHOS 73 02/02/2023    BILITOT 0.5 02/02/2023     Lab Results   Component Value Date    WBC 10.4 02/02/2023    HGB 14.3 02/02/2023    HCT 42.6 02/02/2023    MCV 94 02/02/2023     02/02/2023     Lab Results   Component Value Date    CHOL 272 (H) 07/26/2021    CHOL 268 (H) 06/17/2020     Lab Results   Component Value Date    HDL 36.7 (A) 07/26/2021    HDL 41.9 06/17/2020     No results found for: \"LDLCALC\"  Lab Results   Component Value Date    TRIG 228 (H) 07/26/2021     No components found for: \"CHOLHDL\"  No results found for: \"HGBA1C\"  Other labs not included in the list above were reviewed either before or during this encounter.    History    Past Medical History:   Diagnosis Date    Acute vaginitis     Bacterial vaginosis    Candidiasis, unspecified     Yeast infection    Chondrocostal junction syndrome (tietze) 10/08/2014    Costochondritis    Chondrocostal junction syndrome " (tietze) 05/10/2018    Costochondritis    Circadian rhythm sleep disorder, unspecified type 08/17/2016    Sleep pattern disturbance    Depression, unspecified 07/06/2016    Depression with suicidal ideation    Encounter for gynecological examination (general) (routine) without abnormal findings 12/12/2016    Well female exam with routine gynecological exam    Encounter for screening for malignant neoplasm of vagina     Vaginal Pap smear    Fibromyalgia 07/08/2021    Fibromyalgia    Irritable bowel syndrome with diarrhea 12/27/2016    Irritable bowel syndrome with diarrhea    Migraine with aura, not intractable, without status migrainosus 08/30/2022    Ocular migraine    Myalgia, other site 07/11/2016    Myofascial muscle pain    Myalgia, other site 07/06/2016    Muscular abdominal pain in periumbilical region    Other conditions influencing health status 12/27/2016    History of chronic diarrhea    Other conditions influencing health status 10/17/2017    Abdominal or pelvic pain    Other conditions influencing health status     Normal menstrual period    Other visual disturbances 08/30/2022    Flashing lights    Pain in right shoulder 10/23/2018    Chronic right shoulder pain    Pelvic and perineal pain 08/24/2017    Pelvic pain in female    Pelvic and perineal pain     Pelvic pain    Personal history of contraception     Personal history of contraceptive use    Personal history of diseases of the skin and subcutaneous tissue 05/27/2015    History of cystic acne    Personal history of other diseases of the female genital tract 05/27/2014    Personal history of dysmenorrhea    Personal history of other diseases of the female genital tract 08/28/2019    History of abnormal uterine bleeding    Personal history of other diseases of the female genital tract     History of ovarian cyst    Personal history of other specified conditions 08/30/2022    History of headache    Personal history of other specified conditions  11/07/2016    History of diarrhea    Personal history of other specified conditions 07/11/2016    History of abdominal pain    Personal history of other specified conditions     History of abnormal Pap smear    Unspecified condition associated with female genital organs and menstrual cycle 07/20/2013    Genital symptoms, female    Vitamin D deficiency, unspecified 05/29/2014    Avitaminosis D    Vitamin D deficiency, unspecified 05/10/2018    Avitaminosis D    Vulvar vestibulitis 05/27/2015    Vulvar vestibulitis     Past Surgical History:   Procedure Laterality Date    COLONOSCOPY  11/07/2016    Colonoscopy (Fiberoptic)    ESOPHAGOGASTRODUODENOSCOPY  02/28/2014    Diagnostic Esophagogastroduodenoscopy    LAPAROSCOPY DIAGNOSTIC / BIOPSY / ASPIRATION / LYSIS  02/28/2014    Exploratory Laparoscopy    OTHER SURGICAL HISTORY  10/29/2015    Cholecystotomy     Family History   Problem Relation Name Age of Onset    Other (Cardiac disorder) Mother      Hypertension Mother      Lung cancer Mother      Osteoporosis Mother      Thyroid disease Mother      Depression Father      Other (Malignant neoplasm of urinary bladder) Father      Thyroid disease Father      Other (Malignant neoplasm) Father      Alcohol abuse Brother      Hashimoto's thyroiditis Brother       Social History     Socioeconomic History    Marital status:      Spouse name: Not on file    Number of children: Not on file    Years of education: Not on file    Highest education level: Not on file   Occupational History    Not on file   Tobacco Use    Smoking status: Never    Smokeless tobacco: Never   Vaping Use    Vaping status: Never Used   Substance and Sexual Activity    Alcohol use: Not on file    Drug use: Not on file    Sexual activity: Not on file   Other Topics Concern    Not on file   Social History Narrative    Not on file     Social Determinants of Health     Financial Resource Strain: Not on file   Food Insecurity: Not on file   Transportation  Needs: Not on file   Physical Activity: Not on file   Stress: Not on file   Social Connections: Not on file   Intimate Partner Violence: Not on file   Housing Stability: Not on file     Allergies   Allergen Reactions    Cephalexin Hives    Cephalosporins Hives and Unknown    Fluconazole Diarrhea and Unknown    Hydrocodone-Acetaminophen Unknown and Nausea/vomiting    Penicillins Hives    Clarithromycin Hives, Rash and Unknown     Current Outpatient Medications on File Prior to Visit   Medication Sig Dispense Refill    busPIRone (Buspar) 5 mg tablet Take by mouth.      latanoprost (Xalatan) 0.005 % ophthalmic solution ONE DROP EVERY DAY IN THE MORNING      SUMAtriptan (Imitrex) 25 mg tablet Take by mouth once daily.      hydrOXYzine HCL (Atarax) 25 mg tablet Take 1 tablet (25 mg) by mouth 3 times a day as needed for allergies. 90 tablet 0    levothyroxine (Synthroid, Levoxyl) 100 mcg tablet TAKE 1 TABLET BY MOUTH EVERY DAY 90 tablet 0    melatonin 10 mg capsule Take 1 capsule (10 mg) by mouth. As needed for sleep       No current facility-administered medications on file prior to visit.     Immunization History   Administered Date(s) Administered    Tdap vaccine, age 7 year and older (BOOSTRIX, ADACEL) 03/03/2016     Patient's medical history was reviewed and updated either before or during this encounter.       Alfa Early MD

## 2024-04-24 ENCOUNTER — LAB (OUTPATIENT)
Dept: LAB | Facility: LAB | Age: 44
End: 2024-04-24
Payer: COMMERCIAL

## 2024-04-24 ENCOUNTER — PATIENT MESSAGE (OUTPATIENT)
Dept: PRIMARY CARE | Facility: CLINIC | Age: 44
End: 2024-04-24

## 2024-04-24 DIAGNOSIS — K52.9 CHRONIC DIARRHEA: ICD-10-CM

## 2024-04-24 DIAGNOSIS — E03.9 HYPOTHYROIDISM, UNSPECIFIED: ICD-10-CM

## 2024-04-24 LAB — C DIF TOX TCDA+TCDB STL QL NAA+PROBE: NOT DETECTED

## 2024-04-24 PROCEDURE — 83630 LACTOFERRIN FECAL (QUAL): CPT

## 2024-04-24 PROCEDURE — 87506 IADNA-DNA/RNA PROBE TQ 6-11: CPT

## 2024-04-24 PROCEDURE — 87493 C DIFF AMPLIFIED PROBE: CPT

## 2024-04-25 LAB

## 2024-04-25 RX ORDER — LEVOTHYROXINE SODIUM 100 UG/1
100 TABLET ORAL DAILY
Qty: 100 TABLET | Refills: 1 | Status: SHIPPED | OUTPATIENT
Start: 2024-04-25

## 2024-04-25 NOTE — TELEPHONE ENCOUNTER
From: Barbara Koroma  To: Alfa Early  Sent: 4/24/2024 6:39 PM EDT  Subject: Medication Renewal    Hi ,  I see from my blood test results that my thyroid level is normal? Would you please renew my prescription for the 100mcg of levothyroxine? I don't have any refills left. The [facility] pharmacy that is listed in my Epic chart is fine.  Thank you.  Barbara Koroma

## 2024-04-26 LAB — LACTOFERRIN STL QL IA: NEGATIVE

## 2024-05-16 ENCOUNTER — APPOINTMENT (OUTPATIENT)
Dept: BEHAVIORAL HEALTH | Facility: CLINIC | Age: 44
End: 2024-05-16
Payer: COMMERCIAL

## 2024-05-19 NOTE — PROGRESS NOTES
"Subjective   Barbara Koroma is a 43 y.o. female who is here for No chief complaint on file..     Concerns today:  ***    {Childbearing or Jennifer/postmenopausal?:61782}    Sexual Activity: {Sexual activity (female):15853}  Pain with intercourse? {Yes/No:90083}  Loss of desire? {Yes/No:49228}  Able to have an orgasm? {Yes/No:54045}    History of prior STI: {STI:15241}  Desires STI screening? {yes/no:63906}    Current contraception: {contraceptive method:5051}    Last pap: 1/2019 NILM, HPV (-)  History of abnormal Pap smear: yes - remote history in 2009  Family history of uterine or ovarian cancer: {yes***/no:00183::\"no\"}    Last mammogram: 7/2023 Cat 3 - probably benign (microcalcifications are seen within the upper outer quadrant of L breast) ; Breast US 12/2023 Cat 3 - probably benign   History of abnormal mammogram: N/A  Family history of breast cancer: {yes***/no:65034::\"no\"}  OB History   No obstetric history on file.      Objective   There were no vitals taken for this visit.   OBGyn Exam     Assessment/Plan   {Assess/PlanSmartLinks:24672}  No follow-ups on file.  Brooklyn Nolen, APRN-CNM, APRN-CNP   "

## 2024-05-20 ENCOUNTER — OFFICE VISIT (OUTPATIENT)
Dept: OBSTETRICS AND GYNECOLOGY | Facility: CLINIC | Age: 44
End: 2024-05-20
Payer: COMMERCIAL

## 2024-05-20 VITALS
WEIGHT: 200.2 LBS | HEIGHT: 62 IN | SYSTOLIC BLOOD PRESSURE: 134 MMHG | BODY MASS INDEX: 36.84 KG/M2 | DIASTOLIC BLOOD PRESSURE: 82 MMHG

## 2024-05-20 DIAGNOSIS — Z01.419 WELL WOMAN EXAM: Primary | ICD-10-CM

## 2024-05-20 DIAGNOSIS — N61.1 BOIL, BREAST: ICD-10-CM

## 2024-05-20 DIAGNOSIS — Z12.31 ENCOUNTER FOR SCREENING MAMMOGRAM FOR MALIGNANT NEOPLASM OF BREAST: ICD-10-CM

## 2024-05-20 DIAGNOSIS — Z12.4 CERVICAL CANCER SCREENING: ICD-10-CM

## 2024-05-20 PROBLEM — N94.6 DYSMENORRHEA: Status: ACTIVE | Noted: 2024-04-19

## 2024-05-20 PROCEDURE — 87624 HPV HI-RISK TYP POOLED RSLT: CPT

## 2024-05-20 PROCEDURE — 1036F TOBACCO NON-USER: CPT | Performed by: NURSE PRACTITIONER

## 2024-05-20 PROCEDURE — 99386 PREV VISIT NEW AGE 40-64: CPT | Performed by: NURSE PRACTITIONER

## 2024-05-20 PROCEDURE — 88141 CYTOPATH C/V INTERPRET: CPT | Performed by: PATHOLOGY

## 2024-05-20 PROCEDURE — 88175 CYTOPATH C/V AUTO FLUID REDO: CPT

## 2024-05-20 ASSESSMENT — PAIN SCALES - GENERAL: PAINLEVEL: 0-NO PAIN

## 2024-05-20 NOTE — PROGRESS NOTES
"Subjective   Barbara Koroma is a 43 y.o. female who is here for well woman exam.    Concerns today:  Patient presents today for annual well woman exam. Patient expresses concerns of bilateral breast lumps that appeared in 2023. States left breast lump drained pus for a few days then stopped draining spontaneously. Denies pain at lump sites. Denies any treatment and aggravating/relieving factors. Unable to state if lumps have changed in presentation.    Periods are regular every 28-30 days, lasting 4 days.   Dysmenorrhea:severe, occurring throughout menses.   Cyclic symptoms include anxiety, depression, and headache.     Sexual Activity: not sexually active due to pain from fibromyalgia  Pain with intercourse? Yes     History of prior STI: none  Desires STI screening? No    Current contraception: abstinence    Last pap: 2019 NILM, HPV (-)  History of abnormal Pap smear: yes - remote history in   Family history of uterine or ovarian cancer: no    Last mammogram: 2023 Cat 3 - probably benign (microcalcifications are seen within the upper outer quadrant of L breast); Breast US 2023 Cat 3 - probably benign  History of abnormal mammogram: N/A  Family history of breast cancer: no (great aunt on maternal side)  OB History    Para Term  AB Living   0 0 0 0 0 0   SAB IAB Ectopic Multiple Live Births   0 0 0 0 0      Objective   /82   Ht 1.575 m (5' 2\")   Wt 90.8 kg (200 lb 3.2 oz)    Physical Exam  Constitutional:       Appearance: Normal appearance.   Genitourinary:      Vulva and urethral meatus normal.      No lesions in the vagina.      Genitourinary Comments: Small, circumscribed, erythematous, non-tender bumps. No drainage noted.      No vaginal discharge, erythema, tenderness or bleeding.      No cervical discharge or friability.      No urethral tenderness, mass or discharge present.   Breasts:     Right: Skin change present.      Left: Skin change present. "   Cardiovascular:      Rate and Rhythm: Normal rate and regular rhythm.      Heart sounds: Normal heart sounds.   Pulmonary:      Effort: Pulmonary effort is normal.      Breath sounds: Normal breath sounds.   Chest:       Abdominal:      Palpations: Abdomen is soft.      Tenderness: There is abdominal tenderness in the right lower quadrant and left lower quadrant.   Musculoskeletal:      Cervical back: Neck supple.   Neurological:      General: No focal deficit present.      Mental Status: She is alert and oriented to person, place, and time.   Skin:     General: Skin is warm.   Psychiatric:         Mood and Affect: Mood is anxious.         Behavior: Behavior normal. Behavior is cooperative.         Thought Content: Thought content normal.         Judgment: Judgment normal.     Assessment/Plan   Problem List Items Addressed This Visit             ICD-10-CM       Ob-Gyn Problems    Boil, breast N61.1     Encouraged warm compresses, advised to follow up with Dermatology if not resolving or recurring         Relevant Orders    Referral to Dermatology     Other Visit Diagnoses         Codes    Well woman exam    -  Primary Z01.419    Cervical cancer screening     Z12.4    Relevant Orders    THINPREP PAP TEST DIAGNOSTIC (>25)    Encounter for screening mammogram for malignant neoplasm of breast     Z12.31    Relevant Orders    BI mammo bilateral diagnostic tomosynthesis          Elizabeth Green RN, APRN Student    I was present with the APRN student who participated in the documentation of this note. I have personally seen and re-examined the patient and performed the medical decision-making components (assessment and plan of care). I have reviewed the APRN student documentation and verified the findings in the note as written with additions or exceptions as stated in the body of this note.    Brooklyn Nolen, APRN-CNM, APRN-CNP

## 2024-05-23 ENCOUNTER — HOSPITAL ENCOUNTER (OUTPATIENT)
Dept: RADIOLOGY | Facility: HOSPITAL | Age: 44
Discharge: HOME | End: 2024-05-23
Payer: COMMERCIAL

## 2024-05-23 DIAGNOSIS — Z12.31 ENCOUNTER FOR SCREENING MAMMOGRAM FOR MALIGNANT NEOPLASM OF BREAST: ICD-10-CM

## 2024-05-23 PROCEDURE — 77062 BREAST TOMOSYNTHESIS BI: CPT | Performed by: RADIOLOGY

## 2024-05-23 PROCEDURE — 77066 DX MAMMO INCL CAD BI: CPT | Performed by: RADIOLOGY

## 2024-05-23 PROCEDURE — 77062 BREAST TOMOSYNTHESIS BI: CPT

## 2024-06-03 LAB

## 2024-08-17 ENCOUNTER — APPOINTMENT (OUTPATIENT)
Dept: OPHTHALMOLOGY | Facility: CLINIC | Age: 44
End: 2024-08-17
Payer: COMMERCIAL

## 2024-10-10 ENCOUNTER — HOSPITAL ENCOUNTER (OUTPATIENT)
Dept: RADIOLOGY | Facility: HOSPITAL | Age: 44
Discharge: HOME | End: 2024-10-10
Payer: COMMERCIAL

## 2024-10-10 ENCOUNTER — OFFICE VISIT (OUTPATIENT)
Dept: ORTHOPEDIC SURGERY | Facility: HOSPITAL | Age: 44
End: 2024-10-10
Payer: COMMERCIAL

## 2024-10-10 DIAGNOSIS — M77.52 LEFT ANKLE TENDINITIS: ICD-10-CM

## 2024-10-10 DIAGNOSIS — M25.572 PAIN IN LEFT ANKLE AND JOINTS OF LEFT FOOT: ICD-10-CM

## 2024-10-10 DIAGNOSIS — M25.572 ARTHRALGIA OF LEFT FOOT: Primary | ICD-10-CM

## 2024-10-10 PROCEDURE — 99214 OFFICE O/P EST MOD 30 MIN: CPT

## 2024-10-10 PROCEDURE — 73630 X-RAY EXAM OF FOOT: CPT | Mod: LT

## 2024-10-10 PROCEDURE — L4361 PNEUMA/VAC WALK BOOT PRE OTS: HCPCS

## 2024-10-10 PROCEDURE — 99204 OFFICE O/P NEW MOD 45 MIN: CPT

## 2024-10-10 PROCEDURE — 73610 X-RAY EXAM OF ANKLE: CPT | Mod: LT

## 2024-10-10 PROCEDURE — 1036F TOBACCO NON-USER: CPT

## 2024-10-13 NOTE — PROGRESS NOTES
PRIMARY CARE PHYSICIAN: Taylor Penny MD  REFERRING PROVIDER: No referring provider defined for this encounter.     CONSULT ORTHOPAEDIC: Ankle Evaluation    ASSESSMENT & PLAN    Impression: 43 y.o. female with left foot and ankle arthralgia     Plan:   I explained to the patient the nature of their diagnosis.  I reviewed their imaging studies with them.    Based on the history, physical exam and imaging studies above, the patient's presentation is consistent with the above diagnosis.  I had a long discussion with the patient regarding their presentation and the treatment options.  Patient presents with pain greater than one month in duration of her left foot and ankle with no known injury. Patient is unable to localizes her pain to one spot as it has now traveled into her ankle. She does have a history of fibromyalgia and chronic pain syndrome which may be related to her left foot and ankle pain. We discussed the use of soft tissue rest with immobilization which patient wished to proceed with. She was placed in a walking boot which she will wear to ambulate and my wean as her pain improves. She will ice, elevated and perform gentle ankle ROM exercises. Patient was also provided with a referral to physical therapy to work on her ankle ROM and strengthening. She will continue with OTC ibuprofen as needed. We discussed that should her pain not improve with the above plan over the next 4-6 weeks she should follow-up with a foot/ankle specialist for further evaluation. Patient expressed understanding and was in agreement with the above plan.     Patient was prescribed a CAM walker boot for left ankle arthralgia.The patient is ambulatory with or without aid; but, has weakness, instability and/or deformity of their left ankle/foot which requires stabilization from this orthosis to improve their function.      Verbal and written instructions for the use, wear schedule, cleaning and application of this item were given.   Patient was instructed that should the brace result in increased pain, decreased sensation, increased swelling, or an overall worsening of their medical condition, to please contact our office immediately.     Orthotic management and training was provided for skin care, modifications due to healing tissues, edema changes, interruption in skin integrity, and safety precautions with the orthosis.      At the end of the visit, all questions were answered in full. The patient is in agreement with the plan and recommendations. They will call the office with any questions/concerns.    Note dictated with Guesty software. Completed without full typed error editing and sent to avoid delay.     SUBJECTIVE  CHIEF COMPLAINT:   Chief Complaint   Patient presents with    Left Ankle - Pain    Left Foot - Pain        HPI: Barbara Koroma is a 43 y.o. patient who presents today to the [facility] with left foot/ankle pain. Patient reports her pain began approximately one month ago with no known injury. Her pain originally was localized to the top of her foot which no has progressed to wrapping around the lateral ankle. Pain is described as a constant dull throbbing sensation that is worse with walking and at the sarah of the day. Pain remains constantly at a 3/10 but at the end of the day it is a 7/10. She now feels she is limping due to the pain. She has tried ice, heat, epsom salt baths, and ibuprofen without much relief. Patient reports history of fibromyalgia and chronic pain syndrome which she no longer is established with any provider for.  She has not noticed any swelling, redness or warmth. Denies history of foot/ankle injuries.     They deny any constant or progressive numbness or tingling in their legs.     REVIEW OF SYSTEMS  Constitutional: See HPI for pain assessment, No significant recent weight gain or loss, no fever or chills  Cardiovascular: No chest pain, shortness of breath  Respiratory: No difficulty  breathing, no cough  Gastrointestinal: No nausea, vomiting, diarrhea, constipation  Musculoskeletal: Noted in HPI, positive for pain, restricted motion, stiffness  Integumentary: No rashes, easy bruising or skin lesions  Neurological: No headache, no numbness or tingling in extremities  Psychiatric: No mood changes or memory changes. No social issues  Heme/Lymph: No excessive swelling, easy bruising or excessive bleeding  ENT: No hearing changes, no nosebleeds  Eyes: No vision changes    Past Medical History:   Diagnosis Date    Acute vaginitis     Bacterial vaginosis    Candidiasis, unspecified     Yeast infection    Chondrocostal junction syndrome (tietze) 10/08/2014    Costochondritis    Chondrocostal junction syndrome (tietze) 05/10/2018    Costochondritis    Circadian rhythm sleep disorder, unspecified type 08/17/2016    Sleep pattern disturbance    Depression, unspecified 07/06/2016    Depression with suicidal ideation    Encounter for gynecological examination (general) (routine) without abnormal findings 12/12/2016    Well female exam with routine gynecological exam    Encounter for screening for malignant neoplasm of vagina     Vaginal Pap smear    Fibromyalgia 07/08/2021    Fibromyalgia    Irritable bowel syndrome with diarrhea 12/27/2016    Irritable bowel syndrome with diarrhea    Migraine with aura, not intractable, without status migrainosus 08/30/2022    Ocular migraine    Myalgia, other site 07/11/2016    Myofascial muscle pain    Myalgia, other site 07/06/2016    Muscular abdominal pain in periumbilical region    Other conditions influencing health status 12/27/2016    History of chronic diarrhea    Other conditions influencing health status 10/17/2017    Abdominal or pelvic pain    Other conditions influencing health status     Normal menstrual period    Other visual disturbances 08/30/2022    Flashing lights    Pain in right shoulder 10/23/2018    Chronic right shoulder pain    Pelvic and perineal  pain 08/24/2017    Pelvic pain in female    Pelvic and perineal pain     Pelvic pain    Personal history of contraception     Personal history of contraceptive use    Personal history of diseases of the skin and subcutaneous tissue 05/27/2015    History of cystic acne    Personal history of other diseases of the female genital tract 05/27/2014    Personal history of dysmenorrhea    Personal history of other diseases of the female genital tract 08/28/2019    History of abnormal uterine bleeding    Personal history of other diseases of the female genital tract     History of ovarian cyst    Personal history of other specified conditions 08/30/2022    History of headache    Personal history of other specified conditions 11/07/2016    History of diarrhea    Personal history of other specified conditions 07/11/2016    History of abdominal pain    Personal history of other specified conditions     History of abnormal Pap smear    Unspecified condition associated with female genital organs and menstrual cycle 07/20/2013    Genital symptoms, female    Vitamin D deficiency, unspecified 05/29/2014    Avitaminosis D    Vitamin D deficiency, unspecified 05/10/2018    Avitaminosis D    Vulvar vestibulitis 05/27/2015    Vulvar vestibulitis        Allergies   Allergen Reactions    Cephalexin Hives    Cephalosporins Hives and Unknown    Fluconazole Diarrhea and Unknown    Hydrocodone-Acetaminophen Unknown and Nausea/vomiting    Penicillins Hives    Clarithromycin Hives, Rash and Unknown        Past Surgical History:   Procedure Laterality Date    COLONOSCOPY  11/07/2016    Colonoscopy (Fiberoptic)    ESOPHAGOGASTRODUODENOSCOPY  02/28/2014    Diagnostic Esophagogastroduodenoscopy    LAPAROSCOPY DIAGNOSTIC / BIOPSY / ASPIRATION / LYSIS  02/28/2014    Exploratory Laparoscopy    OTHER SURGICAL HISTORY  10/29/2015    Cholecystotomy        Family History   Problem Relation Name Age of Onset    Other (Cardiac disorder) Mother       Hypertension Mother      Lung cancer Mother      Osteoporosis Mother      Thyroid disease Mother      Depression Father      Other (Malignant neoplasm of urinary bladder) Father      Thyroid disease Father      Other (Malignant neoplasm) Father      Alcohol abuse Brother      Hashimoto's thyroiditis Brother          Social History     Socioeconomic History    Marital status:      Spouse name: Not on file    Number of children: Not on file    Years of education: Not on file    Highest education level: Not on file   Occupational History    Not on file   Tobacco Use    Smoking status: Never     Passive exposure: Never    Smokeless tobacco: Never   Vaping Use    Vaping status: Never Used   Substance and Sexual Activity    Alcohol use: Yes    Drug use: Never    Sexual activity: Not on file   Other Topics Concern    Not on file   Social History Narrative    Not on file     Social Determinants of Health     Financial Resource Strain: Not on file   Food Insecurity: Not on file   Transportation Needs: Not on file   Physical Activity: Not on file   Stress: Not on file   Social Connections: Not on file   Intimate Partner Violence: Not on file   Housing Stability: Not on file        CURRENT MEDICATIONS:   Current Outpatient Medications   Medication Sig Dispense Refill    dicyclomine (Bentyl) 10 mg capsule Take 1 capsule (10 mg) by mouth 3 times a day as needed (abdominal pain or cramps). 30 capsule 0    hydrOXYzine HCL (Atarax) 25 mg tablet Take 1 tablet (25 mg) by mouth 3 times a day as needed for allergies. 90 tablet 0    latanoprost (Xalatan) 0.005 % ophthalmic solution ONE DROP EVERY DAY IN THE MORNING 7.5 mL 0    levothyroxine (Synthroid, Levoxyl) 100 mcg tablet Take 1 tablet (100 mcg) by mouth once daily. 100 tablet 1    melatonin 10 mg capsule Take 1 capsule (10 mg) by mouth. As needed for sleep       No current facility-administered medications for this visit.        OBJECTIVE    PHYSICAL EXAM      9/14/2021      "3:44 PM 3/10/2022     8:30 AM 6/1/2022     8:59 AM 3/2/2023     8:04 AM 12/18/2023    11:06 AM 4/23/2024     4:16 PM 5/20/2024     8:39 AM   Vitals   Systolic 113 116 145 156  146 134   Diastolic 75 78 90 91  88 82   Heart Rate   78 88  93    Temp      36.3 °C (97.3 °F)    Resp 18   16      Height (in) 1.575 m (5' 2\") 1.575 m (5' 2\") 1.575 m (5' 2\") 1.575 m (5' 2\") 1.575 m (5' 2\") 1.575 m (5' 2\") 1.575 m (5' 2\")   Weight (lb) 190 192 184.38 184 185 192 200.2   BMI 34.75 kg/m2 35.12 kg/m2 33.72 kg/m2 33.65 kg/m2 33.84 kg/m2 35.12 kg/m2 36.62 kg/m2   BSA (m2) 1.94 m2 1.95 m2 1.91 m2 1.91 m2 1.92 m2 1.95 m2 1.99 m2   Visit Report      Report Report      There is no height or weight on file to calculate BMI.    GENERAL: A/Ox3, NAD. Appears healthy, well nourished  PSYCHIATRIC: Mood stable, appropriate memory recall  EYES: EOM intact, no scleral icterus  CARDIOVASCULAR: Palpable peripheral pulses  LUNGS: Breathing non-labored on room air  SKIN: No open lesions, rashes, ulcerations     MUSCULOSKELETAL:  Laterality: left Ankle and foot Exam  - Skin intact  - No erythema or warmth  - No ecchymosis or soft tissue swelling  - Alignment: neutral  - Palpation: Positive TTP over dorsum of left foot and along peroneal tendon. No tenderness to palpation over the Achilles, medial and lateral malleolus.   - ROM: Normal ROM in ankle plantar flexion, dorsiflexion, inversion and eversion; normal ROM in 2-5th toe flexion/extension and 1st MTP flexion/extension  - Effusion: none  - Strength: Normal strength in ankle plantar flexion, dorsiflexion, inversion and eversion; normal strength with great toe flexion/extension  - Achilles tendon palpable and intact; Prescott test negative  - Gait: trace antalgic    NEUROVASCULAR:  - Neurovascular Status: sensation intact to light touch distally, lower extremity motor intact  - Capillary refill brisk at extremities, Bilateral dorsalis pedis pulse 2+    Imaging: Multiple views of the affected left " foot and ankle(s) demonstrate: no acute osseous abnormality, no fracture, no significant degenerative changes. Ankle mortise well maintained.   X-rays were personally reviewed and interpreted by me.  Radiology reports were reviewed by me as well, if readily available at the time.

## 2024-10-19 ENCOUNTER — APPOINTMENT (OUTPATIENT)
Dept: OPHTHALMOLOGY | Facility: CLINIC | Age: 44
End: 2024-10-19
Payer: COMMERCIAL

## 2024-10-19 DIAGNOSIS — H40.1131 PRIMARY OPEN ANGLE GLAUCOMA (POAG) OF BOTH EYES, MILD STAGE: ICD-10-CM

## 2024-10-19 PROCEDURE — 99214 OFFICE O/P EST MOD 30 MIN: CPT | Performed by: OPHTHALMOLOGY

## 2024-10-19 RX ORDER — LATANOPROST 50 UG/ML
1 SOLUTION/ DROPS OPHTHALMIC DAILY
Qty: 7.5 ML | Refills: 3 | Status: SHIPPED | OUTPATIENT
Start: 2024-10-19 | End: 2025-01-17

## 2024-10-19 ASSESSMENT — TONOMETRY
OD_IOP_MMHG: 20
IOP_METHOD: GOLDMANN APPLANATION
OS_IOP_MMHG: 20

## 2024-10-19 ASSESSMENT — VISUAL ACUITY
CORRECTION_TYPE: GLASSES
OS_CC: 20/20-1
OD_CC: 20/50+2
METHOD: SNELLEN - LINEAR

## 2024-10-19 ASSESSMENT — CUP TO DISC RATIO
OS_RATIO: .3
OD_RATIO: .3

## 2024-10-19 ASSESSMENT — PACHYMETRY
OD_CT(UM): 564
OS_CT(UM): 562

## 2024-10-19 ASSESSMENT — GONIOSCOPY
OD_SUPERIOR: 3/360
OS_SUPERIOR: 3/360

## 2024-10-19 ASSESSMENT — EXTERNAL EXAM - LEFT EYE: OS_EXAM: NORMAL

## 2024-10-19 ASSESSMENT — SLIT LAMP EXAM - LIDS
COMMENTS: GOOD POSITION
COMMENTS: GOOD POSITION

## 2024-10-19 ASSESSMENT — EXTERNAL EXAM - RIGHT EYE: OD_EXAM: NORMAL

## 2024-10-19 NOTE — PROGRESS NOTES
1-oag stable on meds--refilled  2-ns ou  3=lionel start tears ou  4-h/o migraine ha      Rto 6-8 mos hvf, dilate

## 2024-10-22 ENCOUNTER — EVALUATION (OUTPATIENT)
Dept: PHYSICAL THERAPY | Facility: CLINIC | Age: 44
End: 2024-10-22
Payer: COMMERCIAL

## 2024-10-22 DIAGNOSIS — M77.52 LEFT ANKLE TENDINITIS: Primary | ICD-10-CM

## 2024-10-22 PROCEDURE — 97110 THERAPEUTIC EXERCISES: CPT | Mod: GP

## 2024-10-22 PROCEDURE — 97161 PT EVAL LOW COMPLEX 20 MIN: CPT | Mod: GP
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
## 2024-10-29 ENCOUNTER — TREATMENT (OUTPATIENT)
Dept: PHYSICAL THERAPY | Facility: CLINIC | Age: 44
End: 2024-10-29
Payer: COMMERCIAL

## 2024-10-29 DIAGNOSIS — M77.52 LEFT ANKLE TENDINITIS: ICD-10-CM

## 2024-10-29 PROCEDURE — 97110 THERAPEUTIC EXERCISES: CPT | Mod: GP

## 2024-11-04 ENCOUNTER — TREATMENT (OUTPATIENT)
Dept: PHYSICAL THERAPY | Facility: CLINIC | Age: 44
End: 2024-11-04
Payer: COMMERCIAL

## 2024-11-04 ENCOUNTER — APPOINTMENT (OUTPATIENT)
Dept: DERMATOLOGY | Facility: CLINIC | Age: 44
End: 2024-11-04
Payer: COMMERCIAL

## 2024-11-04 DIAGNOSIS — M77.52 LEFT ANKLE TENDINITIS: ICD-10-CM

## 2024-11-04 PROCEDURE — 97140 MANUAL THERAPY 1/> REGIONS: CPT | Mod: GP

## 2024-11-04 PROCEDURE — 97110 THERAPEUTIC EXERCISES: CPT | Mod: GP

## 2024-11-04 NOTE — PROGRESS NOTES
"      Physical Therapy  Physical Therapy Treatment Note    Patient Name: Barbara Koroma  MRN: [id number]  Today's Date: 11/4/2024  Time Calculation  Start Time: 1727  Stop Time: 1802  Time Calculation (min): 35 min    Insurance:  Number of Treatments Authorized: 2/20v - eval doesnt count (10/25/24 [facility] PT Auth approval 20v approved, eval does not need auth and isnt counted. Dates 10/22/24 to 12/31/24 REF [id number]  AUTH REQUIRED, 90/10 COVERAGE, 30V, OT/PT)  Certification Period Start Date: 10/22/24  Certification Period End Date: 12/31/24  Current Problem  1. Left ankle tendinitis  Follow Up In Physical Therapy          Precautions  Precautions  Precautions Comment: No Fall risk    Subjective   General       Patient reports:  Overall things are not getting worse.  Was not able to get into the pool.  After a long walk when she returned to work she felt it while sitting at her computer.        Performing HEP?: Yes  REIL and posture correction    Pain   1/10 top of left foot     Objective   Ankle ROM - Nil/Min/Mod/Max limit or degrees.   Dorsiflexion: L = min limit - ERP  Plantarflexion: L = min limit  - ERP top of foot   Inversion: L = mil    Functional baselines/special tests:  Slump test: L = positive     Gait: no boot    Treatments:    Therex:   Sitting with roll x 2 min   MAYCO x 1 min   REIL 10 x 2 - improved ankle ROM baselines   REIL with self OP x 5 - NE, NB with slump test   Manual:  MAYCO L/S ext mobs   L/S B rot ext mobs   Therex:  Reviewed proper sitting posture x 2 min  Sustained EIL x 6 min - P Right \"sciatica\" sx, NW, NE on L foot/ankle      OP EDUCATION:   11/5/24: Instructed pt to perform REIL with self Op and/or have  add OP with a belt/towel.  educated on importance of proper sitting posture to maintain reduction in sx, and educated on performance of HEP when she feels sx.      10/29/24: Instructed pt to perform REIL before and after Pool exercises and to try MICKI in the pool if " sx return     Assessment:  Pt's L foot/ankle baselines continue to rapidly improve with lumbar procedures with further reduction when force is increased.  Min/nil progress since last PT session is likely due to her not maintaining reduction in sx with proper sitting posture.  Ext force can be increased because of positive response noted today.  She demo'd understanding of education /instruction above.        Plan:  F/U with instruction/education above.  Reassess if she continues to plateau   Progress function and strengthening as tolerated.   OP PT Plan  Certification Period Start Date: 10/22/24  Certification Period End Date: 12/31/24  Number of Treatments Authorized: 2/20v - eval doesnt count (10/25/24 [facility] PT Auth approval 20v approved, eval does not need auth and isnt counted. Dates 10/22/24 to 12/31/24 REF [id number]  AUTH REQUIRED, 90/10 COVERAGE, 30V, OT/PT)         Vinayak Rodriguez, PT

## 2024-11-06 ENCOUNTER — TELEPHONE (OUTPATIENT)
Dept: PRIMARY CARE | Facility: CLINIC | Age: 44
End: 2024-11-06

## 2024-11-06 DIAGNOSIS — E03.9 HYPOTHYROIDISM, UNSPECIFIED: ICD-10-CM

## 2024-11-08 ENCOUNTER — PATIENT MESSAGE (OUTPATIENT)
Dept: PRIMARY CARE | Facility: CLINIC | Age: 44
End: 2024-11-08
Payer: COMMERCIAL

## 2024-11-08 DIAGNOSIS — E03.9 HYPOTHYROIDISM, UNSPECIFIED: ICD-10-CM

## 2024-11-08 RX ORDER — LEVOTHYROXINE SODIUM 100 UG/1
100 TABLET ORAL DAILY
Qty: 100 TABLET | Refills: 1 | OUTPATIENT
Start: 2024-11-08

## 2024-11-08 RX ORDER — LEVOTHYROXINE SODIUM 100 UG/1
100 TABLET ORAL DAILY
Qty: 30 TABLET | Refills: 0 | Status: SHIPPED | OUTPATIENT
Start: 2024-11-08

## 2024-11-12 ENCOUNTER — TREATMENT (OUTPATIENT)
Dept: PHYSICAL THERAPY | Facility: CLINIC | Age: 44
End: 2024-11-12
Payer: COMMERCIAL

## 2024-11-12 DIAGNOSIS — M77.52 LEFT ANKLE TENDINITIS: ICD-10-CM

## 2024-11-12 PROCEDURE — 97110 THERAPEUTIC EXERCISES: CPT | Mod: GP

## 2024-11-12 NOTE — PROGRESS NOTES
Physical Therapy  Physical Therapy Treatment Note    Patient Name: Barbara Millerycdione  MRN: [id number]  Today's Date: 11/12/2024  Time Calculation  Start Time: 1621  Stop Time: 1638  Time Calculation (min): 17 min    Insurance:  Number of Treatments Authorized: t with HEOP (10/25/24 [facility] PT Auth approval 20v approved, eval does not need auth and isnt counted. Dates 10/22/24 to 12/31/24 REF [id number]  AUTH REQUIRED, 90/10 COVERAGE, 30V, OT/PT)  Certification Period Start Date: 10/22/24  Certification Period End Date: 12/31/24  Current Problem  1. Left ankle tendinitis  Follow Up In Physical Therapy          Precautions  Precautions  Precautions Comment: No Fall risk    Subjective   General       Patient reports:  Things are improving.  Was able to go for a long walk.  Feels like she has has resumed her normal level of function.  Pain is minimal .     Overall things are not getting worse.  Was not able to get into the pool.  After a long walk when she returned to work she felt it while sitting at her computer.        Performing HEP?: Yes  REIL and posture correction    Pain   1/10 top of left foot     Objective   Ankle ROM - Nil/Min/Mod/Max limit or degrees.   Dorsiflexion: L = nil limit   Plantarflexion: L = min limit  - ERP top of foot   Inversion: L = nil    Functional baselines/special tests:  Deep squat - no pain  Fwd step up/down - no pain     Gait: no boot    Treatments:    Therex:   Sitting with roll x 2 min   REIL x 10   RFIL x 10  REIL x 10   RFISitting x 10   REIL x 10    OP EDUCATION:   11/5/24: Instructed pt to perform REIL with self Op and/or have  add OP with a belt/towel.  educated on importance of proper sitting posture to maintain reduction in sx, and educated on performance of HEP when she feels sx.      10/29/24: Instructed pt to perform REIL before and after Pool exercises and to try MICKI in the pool if sx return     Assessment:  Pt's has achieved prior level of function  and reports full lasting reduction in L foot/ankle pain.  She is pleased with her progress and would like to be discharged today. Tolerated ROF exercises without worsening baselines. Educated on maintenance/prevention.  Pt demo'd understanding.        Plan:  Discharge to independent self prevention/management  OP PT Plan  Certification Period Start Date: 10/22/24  Certification Period End Date: 12/31/24  Number of Treatments Authorized: t with HEOP (10/25/24 [facility] PT Auth approval 20v approved, eval does not need auth and isnt counted. Dates 10/22/24 to 12/31/24 REF [id number]  AUTH REQUIRED, 90/10 COVERAGE, 30V, OT/PT)         Vinayak Rodriguez, PT

## 2024-12-03 NOTE — TELEPHONE ENCOUNTER
LV 4/23/24, NV 4/10/25    Levothyroxine 100 mcg    [facility] [address], [place]

## 2024-12-10 DIAGNOSIS — E03.9 HYPOTHYROIDISM, UNSPECIFIED: ICD-10-CM

## 2024-12-10 NOTE — TELEPHONE ENCOUNTER
Dietitian Documentation  Pt and parents seen at [facility]. Mom reports appointment scheduled at [facility] this Friday to discuss transitioning off Enfaport formula.     Anthropometrics:   Length: 58 cm (< 1st %ile/age) (z-score: -2.39)  Weight: 5.05 kg (<1st %ile/age) (z-score: -2.31) Noted weight taken with clothes on.  Weight/Length: 26th %ile (z-score: -0.64)  HC: 37 cm (< 1st %ile/age) (z-score: -3.21)    Z-scores indicative of moderate malnutrition for length and weight; however normal z-score for weight/length indicative of adequate proportions. Noted improvement from previous measurements from early this February.  Average weight gain of +24 gm/day x34 days, meets 100% of goal.   Average length growth of +0.6 cm/week x5 weeks, meets 100% of goal    Nutritional needs: 110-120 kcal/kg/day, up to 2-3 gm protein/kg/day and 100 mL/kg/day    Feeding regimen: Enfaport 27 kcal/oz: 120 mL (over 4 hours at 30 mL/hour), break x2 hours, 120 mL (over 4 hours at 30 mL/hour) BID at 10-2pm and 4-8pm; then continuous feeds at 30 mL/hour x10 hours from 10pm-8am.   This provides total of 540 mL/day (18 oz) for 96 kcal/kg/day and 107 mL/kg/day, which meets 87% of est kcal needs and 100% of est fluid needs.     Plans to increase overall rate to 35 mL/hour but keep same feeding schedule. This would provide total of 630 mL (21 oz) for 113 kcal/kg/day and 126 mL/kg/day, which meets 100% of est kcal and fluid needs.     MNT: Nutrition support    Intervention/Recommendations:   1.) Continue to advance to goal rate of 35 mL/hour with current schedule as tolerated.   2.) Transition to regular formula as tolerated. Noted plan to meet with RD and medical team at [facility] to discusss this.     Monitor/Evaluate: Provided RD contact information. Will follow pt if not being followed at [facility]. Encouraged parents to call with any questions or concerns.          Signatures    Emma Dalton MS, RD, LDN        LOV 4/1024 follow up 4/3/25   Electronically signed by : ENRIQUE CACERES R.N.; Mar  1 2018  5:40PM CST (Author)

## 2024-12-11 RX ORDER — LEVOTHYROXINE SODIUM 100 UG/1
100 TABLET ORAL DAILY
Qty: 90 TABLET | Refills: 1 | Status: SHIPPED | OUTPATIENT
Start: 2024-12-11

## 2025-02-07 ENCOUNTER — DOCUMENTATION (OUTPATIENT)
Dept: PHYSICAL THERAPY | Facility: CLINIC | Age: 45
End: 2025-02-07
Payer: COMMERCIAL

## 2025-02-07 NOTE — PROGRESS NOTES
Discharge Summary    Name: Barbara Koroma  MRN: [id number]  : 1980  Date: 25    Discharge Summary: PT    Discharge Information: Date of discharge 25    Therapy Summary: Pt's has achieved prior level of function and reports full lasting reduction in L foot/ankle pain.  She is pleased with her progress and would like to be discharged today. Tolerated ROF exercises without worsening baselines. Educated on maintenance/prevention.  Pt demo'd understanding.     Discharge Status: Things are improving.  Was able to go for a long walk.  Feels like she has has resumed her normal level of function.  Pain is minimal .       Rehab Discharge Reason: Achieved all and/or the most significant goals(s)

## 2025-04-05 ENCOUNTER — APPOINTMENT (OUTPATIENT)
Dept: OPHTHALMOLOGY | Facility: CLINIC | Age: 45
End: 2025-04-05
Payer: COMMERCIAL

## 2025-04-05 DIAGNOSIS — H40.1131 PRIMARY OPEN ANGLE GLAUCOMA (POAG) OF BOTH EYES, MILD STAGE: Primary | ICD-10-CM

## 2025-04-05 PROCEDURE — 99214 OFFICE O/P EST MOD 30 MIN: CPT | Performed by: OPHTHALMOLOGY

## 2025-04-05 PROCEDURE — 92083 EXTENDED VISUAL FIELD XM: CPT | Performed by: OPHTHALMOLOGY

## 2025-04-05 RX ORDER — LATANOPROST 50 UG/ML
SOLUTION/ DROPS OPHTHALMIC
COMMUNITY
Start: 2025-03-30 | End: 2025-04-05 | Stop reason: SDUPTHER

## 2025-04-05 RX ORDER — LATANOPROST 50 UG/ML
1 SOLUTION/ DROPS OPHTHALMIC DAILY
Qty: 2.5 ML | Refills: 3 | Status: SHIPPED | OUTPATIENT
Start: 2025-04-05 | End: 2025-07-04

## 2025-04-05 ASSESSMENT — CUP TO DISC RATIO
OD_RATIO: .3
OS_RATIO: .3

## 2025-04-05 ASSESSMENT — SLIT LAMP EXAM - LIDS
COMMENTS: GOOD POSITION
COMMENTS: GOOD POSITION

## 2025-04-05 ASSESSMENT — GONIOSCOPY
OD_SUPERIOR: 3/360
OS_SUPERIOR: 3/360

## 2025-04-05 ASSESSMENT — VISUAL ACUITY
OD_CC: 20/60
OS_CC: 20/25
METHOD: SNELLEN - LINEAR
OS_CC+: +1
CORRECTION_TYPE: GLASSES
OD_CC+: +2

## 2025-04-05 ASSESSMENT — EXTERNAL EXAM - RIGHT EYE: OD_EXAM: NORMAL

## 2025-04-05 ASSESSMENT — TONOMETRY
IOP_METHOD: GOLDMANN APPLANATION
OD_IOP_MMHG: 19
OS_IOP_MMHG: 18

## 2025-04-05 ASSESSMENT — EXTERNAL EXAM - LEFT EYE: OS_EXAM: NORMAL

## 2025-04-05 ASSESSMENT — PACHYMETRY
OS_CT(UM): 562
OD_CT(UM): 564

## 2025-04-10 ENCOUNTER — APPOINTMENT (OUTPATIENT)
Dept: PRIMARY CARE | Facility: CLINIC | Age: 45
End: 2025-04-10
Payer: COMMERCIAL

## 2025-05-01 PROBLEM — E06.3 HYPOTHYROIDISM DUE TO HASHIMOTO'S THYROIDITIS: Status: RESOLVED | Noted: 2019-02-22 | Resolved: 2025-05-01

## 2025-05-01 PROBLEM — B49 INFECTION DUE TO FUNGUS: Status: RESOLVED | Noted: 2024-04-19 | Resolved: 2025-05-01

## 2025-05-01 PROBLEM — E55.9 VITAMIN D DEFICIENCY: Status: RESOLVED | Noted: 2023-10-05 | Resolved: 2025-05-01

## 2025-05-01 PROBLEM — F33.2 SEVERE EPISODE OF RECURRENT MAJOR DEPRESSIVE DISORDER, WITHOUT PSYCHOTIC FEATURES (MULTI): Status: RESOLVED | Noted: 2019-02-22 | Resolved: 2025-05-01

## 2025-05-01 PROBLEM — E04.1 UNINODULAR GOITER: Status: RESOLVED | Noted: 2023-10-05 | Resolved: 2025-05-01

## 2025-05-01 PROBLEM — G47.20 SLEEP PATTERN DISTURBANCE: Status: RESOLVED | Noted: 2024-04-19 | Resolved: 2025-05-01

## 2025-05-01 PROBLEM — J02.9 SORE THROAT: Status: RESOLVED | Noted: 2023-10-05 | Resolved: 2025-05-01

## 2025-05-01 NOTE — PROGRESS NOTES
[facility]: [facility]  PHYSICAL EXAM      Barbara Koroma is a 44 y.o. female that is presenting today for annual physical exam and management of medical conditions.  She would like lab work, mammogram order and levothyroxine refill today    Assessment/Plan   Assessment & Plan  Annual physical exam  Medications and problem list reconciled today    Mammogram 5/2/2024  Colonoscopy June 2023 at [facility]    Needs labs       Irritable bowel syndrome with diarrhea  Takes nothing for the IBS  States she has tried multiple medications  Orders:    Comprehensive Metabolic Panel; Future    CBC; Future    Insomnia, unspecified type  Managed with melatonin 10mg as needed       Hypothyroidism due to Hashimoto thyroiditis  Continue levothyroxine 100mcg daily  Orders:    Thyroid Stimulating Hormone; Future    Mixed anxiety depressive disorder  Has tried ketamine infusion, ECT and 13 antidepressant without any improvement        Migraine without aura and without status migrainosus, not intractable  Triggered by menses       Fibromyalgia  Multiple medications without relief  Has seen rheumatology       Encounter for screening mammogram for breast cancer    Orders:    BI mammo bilateral screening tomosynthesis; Future    Hypothyroidism, unspecified    Orders:    levothyroxine (Synthroid, Levoxyl) 100 mcg tablet; Take 1 tablet (100 mcg) by mouth once daily.    Impaired fasting glucose    Orders:    Hemoglobin A1C; Future    Vitamin D deficiency    Orders:    Vitamin D 25-Hydroxy,Total (for eval of Vitamin D levels); Future      Subjective   HPI  Review of Systems   Constitutional:  Positive for fatigue.   HENT: Negative.     Eyes: Negative.    Respiratory: Negative.     Cardiovascular: Negative.    Gastrointestinal:  Positive for diarrhea.   Endocrine: Negative.    Genitourinary: Negative.    Musculoskeletal:  Positive for myalgias.        Fibromyalgia   Allergic/Immunologic: Negative.     Neurological: Negative.    Hematological: Negative.    Psychiatric/Behavioral:  Positive for sleep disturbance.       Objective   Vitals:    05/02/25 1405   BP: 138/80   Pulse: 86   Temp: 36.2 °C (97.1 °F)   SpO2: 99%     Body mass index is 36.58 kg/m².  Physical Exam  Vitals and nursing note reviewed.   Constitutional:       Appearance: Normal appearance.   HENT:      Head: Normocephalic and atraumatic.      Right Ear: Tympanic membrane and ear canal normal.      Left Ear: Tympanic membrane and ear canal normal.      Nose: Nose normal.      Mouth/Throat:      Mouth: Mucous membranes are moist.      Pharynx: Oropharynx is clear.   Eyes:      Extraocular Movements: Extraocular movements intact.      Pupils: Pupils are equal, round, and reactive to light.   Cardiovascular:      Rate and Rhythm: Normal rate and regular rhythm.      Pulses: Normal pulses.      Heart sounds: Normal heart sounds.   Pulmonary:      Effort: Pulmonary effort is normal.      Breath sounds: Normal breath sounds.   Abdominal:      General: Bowel sounds are normal.      Palpations: Abdomen is soft.   Musculoskeletal:         General: Tenderness present.      Cervical back: Normal range of motion and neck supple.      Comments: Tenderness d/t fibromyalgia   Skin:     General: Skin is warm and dry.      Capillary Refill: Capillary refill takes less than 2 seconds.   Neurological:      General: No focal deficit present.      Mental Status: She is alert and oriented to person, place, and time.   Psychiatric:         Mood and Affect: Mood normal.         Behavior: Behavior normal.         Thought Content: Thought content normal.         Judgment: Judgment normal.       Vitals:    05/02/25 1405   BP: 138/80   Pulse: 86   Temp: 36.2 °C (97.1 °F)   SpO2: 99%       Diagnostic Results   Lab Results   Component Value Date    GLUCOSE 103 (H) 04/23/2024    CALCIUM 9.8 04/23/2024     04/23/2024    K 4.5 04/23/2024    CO2 25 04/23/2024      "04/23/2024    BUN 11 04/23/2024    CREATININE 0.80 04/23/2024     Lab Results   Component Value Date    ALT 28 04/23/2024    AST 22 04/23/2024    ALKPHOS 73 04/23/2024    BILITOT <0.2 04/23/2024     Lab Results   Component Value Date    WBC 7.2 04/23/2024    HGB 13.3 04/23/2024    HCT 39.6 04/23/2024    MCV 94 04/23/2024     04/23/2024     Lab Results   Component Value Date    CHOL 272 (H) 07/26/2021    CHOL 268 (H) 06/17/2020     Lab Results   Component Value Date    HDL 36.7 (A) 07/26/2021    HDL 41.9 06/17/2020     No results found for: \"LDLCALC\"  Lab Results   Component Value Date    TRIG 228 (H) 07/26/2021     No components found for: \"CHOLHDL\"  No results found for: \"HGBA1C\"  Other labs not included in the list above were reviewed either before or during this encounter.    History   Medical History[1]  Surgical History[2]  Family History[3]  Social History     Socioeconomic History    Marital status:      Spouse name: Not on file    Number of children: Not on file    Years of education: Not on file    Highest education level: Not on file   Occupational History    Not on file   Tobacco Use    Smoking status: Never     Passive exposure: Never    Smokeless tobacco: Never   Vaping Use    Vaping status: Never Used   Substance and Sexual Activity    Alcohol use: Yes     Alcohol/week: 6.0 standard drinks of alcohol     Types: 6 Shots of liquor per week    Drug use: Yes     Types: Marijuana     Comment: CBD gummy    Sexual activity: Not Currently   Other Topics Concern    Not on file   Social History Narrative    Not on file     Social Drivers of Health     Financial Resource Strain: Not on file   Food Insecurity: Not on file   Transportation Needs: Not on file   Physical Activity: Not on file   Stress: Not on file   Social Connections: Not on file   Intimate Partner Violence: Not on file   Housing Stability: Not on file     Allergies[4]  Medications Ordered Prior to Encounter[5]  Immunization History "   Administered Date(s) Administered    Tdap vaccine, age 7 year and older (BOOSTRIX, ADACEL) 03/03/2016     Patient's medical history was reviewed and updated either before or during this encounter.       Amrita Mcgarry, APRN-CNP         [1]   Past Medical History:  Diagnosis Date    Acute vaginitis     Bacterial vaginosis    Anxiety     Candidiasis, unspecified     Yeast infection    Chondrocostal junction syndrome (tietze) 10/08/2014    Costochondritis    Chondrocostal junction syndrome (tietze) 05/10/2018    Costochondritis    Circadian rhythm sleep disorder, unspecified type 08/17/2016    Sleep pattern disturbance    Depression, unspecified 07/06/2016    Depression with suicidal ideation    Disease of thyroid gland     Encounter for gynecological examination (general) (routine) without abnormal findings 12/12/2016    Well female exam with routine gynecological exam    Encounter for screening for malignant neoplasm of vagina     Vaginal Pap smear    Fibromyalgia 07/08/2021    Fibromyalgia    Glaucoma     Irritable bowel syndrome with diarrhea 12/27/2016    Irritable bowel syndrome with diarrhea    Migraine with aura, not intractable, without status migrainosus 08/30/2022    Ocular migraine    Myalgia, other site 07/11/2016    Myofascial muscle pain    Myalgia, other site 07/06/2016    Muscular abdominal pain in periumbilical region    Other conditions influencing health status 12/27/2016    History of chronic diarrhea    Other conditions influencing health status 10/17/2017    Abdominal or pelvic pain    Other conditions influencing health status     Normal menstrual period    Other visual disturbances 08/30/2022    Flashing lights    Pain in right shoulder 10/23/2018    Chronic right shoulder pain    Pelvic and perineal pain 08/24/2017    Pelvic pain in female    Pelvic and perineal pain     Pelvic pain    Personal history of contraception     Personal history of contraceptive use    Personal history of diseases  of the skin and subcutaneous tissue 05/27/2015    History of cystic acne    Personal history of other diseases of the female genital tract 05/27/2014    Personal history of dysmenorrhea    Personal history of other diseases of the female genital tract 08/28/2019    History of abnormal uterine bleeding    Personal history of other diseases of the female genital tract     History of ovarian cyst    Personal history of other specified conditions 08/30/2022    History of headache    Personal history of other specified conditions 11/07/2016    History of diarrhea    Personal history of other specified conditions 07/11/2016    History of abdominal pain    Personal history of other specified conditions     History of abnormal Pap smear    Unspecified condition associated with female genital organs and menstrual cycle 07/20/2013    Genital symptoms, female    Vitamin D deficiency, unspecified 05/29/2014    Avitaminosis D    Vitamin D deficiency, unspecified 05/10/2018    Avitaminosis D    Vulvar vestibulitis 05/27/2015    Vulvar vestibulitis   [2]   Past Surgical History:  Procedure Laterality Date    CHOLECYSTECTOMY  2015    COLONOSCOPY  11/07/2016    Colonoscopy (Fiberoptic)    ESOPHAGOGASTRODUODENOSCOPY  02/28/2014    Diagnostic Esophagogastroduodenoscopy    LAPAROSCOPY DIAGNOSTIC / BIOPSY / ASPIRATION / LYSIS  02/28/2014    Exploratory Laparoscopy    OTHER SURGICAL HISTORY  10/29/2015    Cholecystotomy   [3]   Family History  Problem Relation Name Age of Onset    Other (Cardiac disorder) Mother Derry     Hypertension Mother Derry     Lung cancer Mother Derry     Osteoporosis Mother Derry     Thyroid disease Mother Derry     Depression Father Zachariah     Other (Malignant neoplasm of urinary bladder) Father Zachariah     Thyroid disease Father Zachariah     Other (Malignant neoplasm) Father Zachariah     Alcohol abuse Brother Oscar     Hashimoto's thyroiditis Brother Oscar     Drug abuse Brother Oscar    [4]   Allergies  Allergen  Reactions    Cephalexin Hives    Cephalosporins Hives and Unknown    Fluconazole Diarrhea and Unknown    Hydrocodone-Acetaminophen Unknown and Nausea/vomiting    Penicillins Hives    Clarithromycin Hives, Rash and Unknown   [5]   Current Outpatient Medications on File Prior to Visit   Medication Sig Dispense Refill    latanoprost (Xalatan) 0.005 % ophthalmic solution Administer 1 drop into both eyes early in the morning.. 2.5 mL 3    levothyroxine (Synthroid, Levoxyl) 100 mcg tablet TAKE 1 TABLET BY MOUTH ONCE DAILY. 90 tablet 1    medical cannabis Take 1 each by mouth. CBD gummy prn anxiety      melatonin 10 mg capsule Take 1 capsule (10 mg) by mouth. As needed for sleep       No current facility-administered medications on file prior to visit.

## 2025-05-01 NOTE — ASSESSMENT & PLAN NOTE
Takes nothing for the IBS  States she has tried multiple medications  Orders:    Comprehensive Metabolic Panel; Future    CBC; Future

## 2025-05-02 ENCOUNTER — OFFICE VISIT (OUTPATIENT)
Dept: PRIMARY CARE | Facility: CLINIC | Age: 45
End: 2025-05-02
Payer: COMMERCIAL

## 2025-05-02 VITALS
WEIGHT: 200 LBS | HEART RATE: 86 BPM | BODY MASS INDEX: 36.8 KG/M2 | HEIGHT: 62 IN | TEMPERATURE: 97.1 F | OXYGEN SATURATION: 99 % | DIASTOLIC BLOOD PRESSURE: 80 MMHG | SYSTOLIC BLOOD PRESSURE: 138 MMHG

## 2025-05-02 DIAGNOSIS — Z12.31 ENCOUNTER FOR SCREENING MAMMOGRAM FOR BREAST CANCER: ICD-10-CM

## 2025-05-02 DIAGNOSIS — E06.3 HYPOTHYROIDISM DUE TO HASHIMOTO THYROIDITIS: ICD-10-CM

## 2025-05-02 DIAGNOSIS — N94.10 UNSPECIFIED DYSPAREUNIA: ICD-10-CM

## 2025-05-02 DIAGNOSIS — R73.01 IMPAIRED FASTING GLUCOSE: ICD-10-CM

## 2025-05-02 DIAGNOSIS — Z00.00 ANNUAL PHYSICAL EXAM: Primary | ICD-10-CM

## 2025-05-02 DIAGNOSIS — Z12.31 ENCOUNTER FOR SCREENING MAMMOGRAM FOR MALIGNANT NEOPLASM OF BREAST: ICD-10-CM

## 2025-05-02 DIAGNOSIS — G47.00 INSOMNIA, UNSPECIFIED TYPE: ICD-10-CM

## 2025-05-02 DIAGNOSIS — E03.9 HYPOTHYROIDISM, UNSPECIFIED: ICD-10-CM

## 2025-05-02 DIAGNOSIS — G43.009 MIGRAINE WITHOUT AURA AND WITHOUT STATUS MIGRAINOSUS, NOT INTRACTABLE: ICD-10-CM

## 2025-05-02 DIAGNOSIS — M79.7 FIBROMYALGIA: ICD-10-CM

## 2025-05-02 DIAGNOSIS — K58.0 IRRITABLE BOWEL SYNDROME WITH DIARRHEA: ICD-10-CM

## 2025-05-02 DIAGNOSIS — F41.8 MIXED ANXIETY DEPRESSIVE DISORDER: ICD-10-CM

## 2025-05-02 DIAGNOSIS — E55.9 VITAMIN D DEFICIENCY: ICD-10-CM

## 2025-05-02 PROBLEM — L70.0 CYSTIC ACNE: Status: RESOLVED | Noted: 2024-04-19 | Resolved: 2025-05-02

## 2025-05-02 PROBLEM — G25.2 RESTING TREMOR: Status: RESOLVED | Noted: 2023-10-05 | Resolved: 2025-05-02

## 2025-05-02 PROBLEM — R51.9 HEADACHE: Status: RESOLVED | Noted: 2024-04-19 | Resolved: 2025-05-02

## 2025-05-02 PROBLEM — G56.20 ULNAR NEUROPATHY: Status: RESOLVED | Noted: 2023-10-05 | Resolved: 2025-05-02

## 2025-05-02 PROCEDURE — 1036F TOBACCO NON-USER: CPT | Performed by: NURSE PRACTITIONER

## 2025-05-02 PROCEDURE — 99396 PREV VISIT EST AGE 40-64: CPT | Performed by: NURSE PRACTITIONER

## 2025-05-02 PROCEDURE — 3008F BODY MASS INDEX DOCD: CPT | Performed by: NURSE PRACTITIONER

## 2025-05-02 RX ORDER — LEVOTHYROXINE SODIUM 100 UG/1
100 TABLET ORAL DAILY
Qty: 90 TABLET | Refills: 1 | Status: SHIPPED | OUTPATIENT
Start: 2025-05-02

## 2025-05-02 ASSESSMENT — PROMIS GLOBAL HEALTH SCALE
CARRYOUT_SOCIAL_ACTIVITIES: GOOD
RATE_AVERAGE_FATIGUE: SEVERE
RATE_AVERAGE_PAIN: 5
EMOTIONAL_PROBLEMS: ALWAYS
RATE_PHYSICAL_HEALTH: FAIR
RATE_SOCIAL_SATISFACTION: POOR
CARRYOUT_PHYSICAL_ACTIVITIES: COMPLETELY
RATE_GENERAL_HEALTH: POOR
RATE_MENTAL_HEALTH: POOR
RATE_QUALITY_OF_LIFE: POOR

## 2025-05-02 ASSESSMENT — PATIENT HEALTH QUESTIONNAIRE - PHQ9
1. LITTLE INTEREST OR PLEASURE IN DOING THINGS: NOT AT ALL
2. FEELING DOWN, DEPRESSED OR HOPELESS: NOT AT ALL
SUM OF ALL RESPONSES TO PHQ9 QUESTIONS 1 AND 2: 0

## 2025-05-02 ASSESSMENT — ENCOUNTER SYMPTOMS
HEMATOLOGIC/LYMPHATIC NEGATIVE: 1
FATIGUE: 1
SLEEP DISTURBANCE: 1
DIARRHEA: 1
MYALGIAS: 1
RESPIRATORY NEGATIVE: 1
CARDIOVASCULAR NEGATIVE: 1
NEUROLOGICAL NEGATIVE: 1
EYES NEGATIVE: 1
ENDOCRINE NEGATIVE: 1
ALLERGIC/IMMUNOLOGIC NEGATIVE: 1

## 2025-05-02 ASSESSMENT — PAIN SCALES - GENERAL: PAINLEVEL_OUTOF10: 5

## 2025-05-03 DIAGNOSIS — E55.9 VITAMIN D DEFICIENCY: Primary | ICD-10-CM

## 2025-05-03 LAB
25(OH)D3+25(OH)D2 SERPL-MCNC: 13 NG/ML (ref 30–100)
ALBUMIN SERPL-MCNC: 4.5 G/DL (ref 3.6–5.1)
ALP SERPL-CCNC: 67 U/L (ref 31–125)
ALT SERPL-CCNC: 31 U/L (ref 6–29)
ANION GAP SERPL CALCULATED.4IONS-SCNC: 11 MMOL/L (CALC) (ref 7–17)
AST SERPL-CCNC: 31 U/L (ref 10–35)
BILIRUB SERPL-MCNC: 0.4 MG/DL (ref 0.2–1.2)
BUN SERPL-MCNC: 12 MG/DL (ref 7–25)
CALCIUM SERPL-MCNC: 9.1 MG/DL (ref 8.6–10.2)
CHLORIDE SERPL-SCNC: 103 MMOL/L (ref 98–110)
CO2 SERPL-SCNC: 22 MMOL/L (ref 20–32)
CREAT SERPL-MCNC: 0.85 MG/DL (ref 0.5–0.99)
EGFRCR SERPLBLD CKD-EPI 2021: 86 ML/MIN/1.73M2
ERYTHROCYTE [DISTWIDTH] IN BLOOD BY AUTOMATED COUNT: 13 % (ref 11–15)
EST. AVERAGE GLUCOSE BLD GHB EST-MCNC: 111 MG/DL
EST. AVERAGE GLUCOSE BLD GHB EST-SCNC: 6.2 MMOL/L
GLUCOSE SERPL-MCNC: 99 MG/DL (ref 65–99)
HBA1C MFR BLD: 5.5 %
HCT VFR BLD AUTO: 40.4 % (ref 35–45)
HGB BLD-MCNC: 13.4 G/DL (ref 11.7–15.5)
MCH RBC QN AUTO: 32.2 PG (ref 27–33)
MCHC RBC AUTO-ENTMCNC: 33.2 G/DL (ref 32–36)
MCV RBC AUTO: 97.1 FL (ref 80–100)
PLATELET # BLD AUTO: 256 THOUSAND/UL (ref 140–400)
PMV BLD REES-ECKER: 9.5 FL (ref 7.5–12.5)
POTASSIUM SERPL-SCNC: 4.1 MMOL/L (ref 3.5–5.3)
PROT SERPL-MCNC: 7.6 G/DL (ref 6.1–8.1)
RBC # BLD AUTO: 4.16 MILLION/UL (ref 3.8–5.1)
SODIUM SERPL-SCNC: 136 MMOL/L (ref 135–146)
TSH SERPL-ACNC: 3.49 MIU/L
WBC # BLD AUTO: 7.5 THOUSAND/UL (ref 3.8–10.8)

## 2025-05-03 RX ORDER — ERGOCALCIFEROL 1.25 MG/1
1.25 CAPSULE ORAL WEEKLY
Qty: 12 CAPSULE | Refills: 0 | Status: SHIPPED | OUTPATIENT
Start: 2025-05-03 | End: 2025-07-26

## 2025-05-29 ENCOUNTER — HOSPITAL ENCOUNTER (OUTPATIENT)
Dept: RADIOLOGY | Facility: HOSPITAL | Age: 45
Discharge: HOME | End: 2025-05-29
Payer: COMMERCIAL

## 2025-05-29 VITALS — BODY MASS INDEX: 36.8 KG/M2 | WEIGHT: 200 LBS | HEIGHT: 62 IN

## 2025-05-29 DIAGNOSIS — Z12.31 ENCOUNTER FOR SCREENING MAMMOGRAM FOR BREAST CANCER: ICD-10-CM

## 2025-05-29 PROCEDURE — 77063 BREAST TOMOSYNTHESIS BI: CPT | Performed by: RADIOLOGY

## 2025-05-29 PROCEDURE — 77063 BREAST TOMOSYNTHESIS BI: CPT

## 2025-05-29 PROCEDURE — 77067 SCR MAMMO BI INCL CAD: CPT | Performed by: RADIOLOGY

## 2025-05-30 NOTE — RESULT ENCOUNTER NOTE
Let the patient know via mychart- that the mammogram shows breasts are dense no suspicious masses or calcifications.  No evidence of malignancy  ( cancer ) - repeat in 1 year per Amrita Mcgarry, CNP

## 2025-10-07 ENCOUNTER — APPOINTMENT (OUTPATIENT)
Dept: OPHTHALMOLOGY | Facility: CLINIC | Age: 45
End: 2025-10-07
Payer: COMMERCIAL